# Patient Record
Sex: MALE | Employment: FULL TIME | URBAN - METROPOLITAN AREA
[De-identification: names, ages, dates, MRNs, and addresses within clinical notes are randomized per-mention and may not be internally consistent; named-entity substitution may affect disease eponyms.]

---

## 2022-12-05 ENCOUNTER — HOSPITAL ENCOUNTER (OUTPATIENT)
Dept: GENERAL RADIOLOGY | Age: 27
Discharge: HOME | End: 2022-12-05
Payer: COMMERCIAL

## 2022-12-05 ENCOUNTER — HOSPITAL ENCOUNTER (OUTPATIENT)
Age: 27
Discharge: HOME | End: 2022-12-05

## 2022-12-05 DIAGNOSIS — M25.40 PAINFUL SWELLING OF JOINT: ICD-10-CM

## 2022-12-05 PROCEDURE — 73610 X-RAY EXAM OF ANKLE: CPT

## 2022-12-29 ENCOUNTER — HOSPITAL ENCOUNTER (OUTPATIENT)
Dept: PHYSICAL THERAPY | Age: 27
Setting detail: THERAPIES SERIES
Discharge: HOME | End: 2022-12-29
Payer: COMMERCIAL

## 2022-12-29 PROCEDURE — 97530 THERAPEUTIC ACTIVITIES: CPT

## 2022-12-29 PROCEDURE — 97161 PT EVAL LOW COMPLEX 20 MIN: CPT

## 2022-12-29 PROCEDURE — 97016 VASOPNEUMATIC DEVICE THERAPY: CPT

## 2022-12-29 ASSESSMENT — PAIN DESCRIPTION - LOCATION: LOCATION: ANKLE

## 2022-12-29 ASSESSMENT — PAIN - FUNCTIONAL ASSESSMENT: PAIN_FUNCTIONAL_ASSESSMENT: PREVENTS OR INTERFERES WITH MANY ACTIVE NOT PASSIVE ACTIVITIES

## 2022-12-29 ASSESSMENT — PAIN DESCRIPTION - ONSET: ONSET: ON-GOING

## 2022-12-29 ASSESSMENT — PAIN DESCRIPTION - ORIENTATION: ORIENTATION: RIGHT

## 2022-12-29 ASSESSMENT — PAIN DESCRIPTION - FREQUENCY: FREQUENCY: INTERMITTENT

## 2022-12-29 ASSESSMENT — PAIN DESCRIPTION - PAIN TYPE: TYPE: CHRONIC PAIN

## 2022-12-29 ASSESSMENT — PAIN SCALES - GENERAL: PAINLEVEL_OUTOF10: 5

## 2022-12-29 NOTE — PLAN OF CARE
Decreased tolerance to work activity, Decreased strength, Decreased balance, Increased pain (edema)    Statement of Medical Necessity: Physical Therapy is both indicated and medically necessary as outlined in the POC to increase the likelihood of meeting the functionally related goals stated below. Patient's Activity Tolerance: Patient tolerated evaluation without incident      Patient's rehabilitation potential/prognosis is considered to be: Good    Factors which may impact rehabilitation potential include: None        GOALS   Patient Goal(s): Return to PLOF and work w/o restrictions or pain      Long Term Goals Completed by In 12 visits by 1/19/23, patient will Goal Status   demonstrate compliance and independence w/HEP. score at <= 5% disability on LEFS as indication of improved function w/daily activity. report return to full work duty activities w/no >= 3/10 pain. ambulate >= 1,200 ft in 6' w/o AD or ankle brace, normalized gait pattern and <= 2/10 ankle discomfort. ascend/descend 11 stairs, (1) HR light support, reciprocal pattern, normalized gait, <= 2/10 ankle discomfort.                                         TREATMENT PLAN       Requires PT Follow-Up: Yes  Treatment Initiated : Yes  Specific Instructions for Next Treatment: ankle PROM/AAROM, instruct isometrics, advance PREs over course of sessions, e-stim/vaso for pain/edema, balance/gait/stairs    Pt. actively involved in establishing Plan of Care and Goals: Yes  Patient/ Caregiver education and instruction: Goals, PT Role, Plan of Care, Evaluative findings, Home Exercise Program             Treatment may include any combination of the following: Current Treatment Recommendations: Strengthening, ROM, Balance training, Manual, Neuromuscular re-education, Stair training, Gait training, Pain management, Return to work related activity, Home exercise program, Patient/Caregiver education & training, Therapeutic activities, Dry needling, Modalities  Modalities: E-stim - unattended, Vasopneumatic Device (DNT)     Frequency / Duration:  Patient to be seen 2-3x/wk, up to 12 visits by 1/19/23 for   weeks       Patient Status: [x] Continue / Initiate Plan of Care     Signature: Electronically signed by Kai Carver PT on 12/29/2022 at 12:53 PM.     If you have any questions or concerns, please don't hesitate to call.   Thank you for your referral!

## 2022-12-29 NOTE — FLOWSHEET NOTE
Outpatient Physical Therapy  [facility]           [x] Phone: [phone]   Fax: [phone]  [facility]           [] Phone: [phone]   Fax: [phone]        Physical Therapy Daily Treatment Note  Date:  2022    Patient Name:  Marcie Martini    :  1995  MRN: [id number]  Restrictions/Precautions: No data recorded   Position Activity Restriction  Other position/activity restrictions: On light duty at work ([facility])  No lifting >10#, no heavy machinery, allowed to drive, on administrative duty at the time. Diagnosis:   Sprain of unspecified ligament of right ankle, initial encounter [S93.401A]    Date of Injury/Surgery: 22  Treatment Diagnosis:  right ankle pain/swelling, RLE weakness, impaired ankle AROM, antalgic gait  Insurance/Certification information: [facility] (12 visits by 23)  Referring Physician:  Megan Galvez PA-C     PCP: Sony Gamez PA-C  Next Doctor Visit:  Unknown  Plan of care signed (Y/N):  Pending  Outcome Measure: LEFS @ eval 25% disability  Visit# / total visits:     by 23  Pain level: 5/10   Goals:     Patient goals: Return to PLOF and work w/o restrictions or pain     Long Term Goals  Time Frame for Long Term Goals: In 12 visits by 23, patient will  demonstrate compliance and independence w/HEP. score at <= 5% disability on LEFS as indication of improved function w/daily activity. report return to full work duty activities w/no >= 3/10 pain. ambulate >= 1,200 ft in 6' w/o AD or ankle brace, normalized gait pattern and <= 2/10 ankle discomfort. ascend/descend 11 stairs, (1) HR light support, reciprocal pattern, normalized gait, <= 2/10 ankle discomfort. Summary of Evaluation:  Assessment: Patient is a 33 y.o. male w/DX sprain unspecified ligament R ankle. Pt reports injuring his R ankle while on a medical run when he stepped out of the ambulance.   His ankle rolled inward (weight taken through lateral foot.)  PLOF: Independent all mobility and self-care, no ankle pain. CURRENT LOF:  ankle throbbing pain, wearing air splint and ace wrap R ankle/foot, pain w/walking and any standing activities. Pt presents w/medial and lateral ankle pain/swelling, pain medial ankle/calf w/wbing, gait is antalgic, RLE weakness and impaired ankle ROM. No obvious joint laxity R ankle. Pt will benefit from physical therapy to address deficits related to R ankle injury which is limiting mobility and ability to work his full-time job w/o restrictions. Subjective:  See eval   MRI pending      Any changes in Ambulatory Summary Sheet? None        Objective:  See eval           Exercises: (No more than 4 columns)   Exercise/Equipment Date 12/29/22 #1 Date Date           WARM UP                     TABLE      Toe curls 2 x 10                                STANDING                                                     PROPRIOCEPTION                                    MODALITIES      VASO See below               Other Therapeutic Activities/Education:    Eval:    POC and treatment rationale/progression expected reviewed w/and accepted by patient. Reviewed RICE procedures for post injury swelling, edema, pain. Continue to wear air-splint or similar device to limit medial/lateral mobility at ankle. Pt having difficulty wearing regular shoes w/current air-splint. Home Exercise Program:    12/29/22:  toe curls    Manual Treatments:    NO    Modalities:    VASO  Sitting x 15'  Tolerated full compression and coldest setting  Pt tolerated well and without skin concerns. Communication with other providers:    POC faxed 12/29/22    Assessment:  (Response towards treatment session) (Pain Rating)  Assessment: Patient is a 33 y.o. male w/DX sprain unspecified ligament R ankle. Pt reports injuring his R ankle while on a medical run when he stepped out of the ambulance.   His ankle rolled inward (weight taken through lateral foot.)  PLOF:  Independent all mobility and self-care, no ankle pain. CURRENT LOF:  ankle throbbing pain, wearing air splint and ace wrap R ankle/foot, pain w/walking and any standing activities. Pt presents w/medial and lateral ankle pain/swelling, pain medial ankle/calf w/wbing, gait is antalgic, RLE weakness and impaired ankle ROM. No obvious joint laxity R ankle. Pt will benefit from physical therapy to address deficits related to R ankle injury which is limiting mobility and ability to work his full-time job w/o restrictions.       Plan for Next Session:   Specific Instructions for Next Treatment: ankle PROM/AAROM, instruct isometrics, advance PREs over course of sessions, e-stim/vaso for pain/edema, balance/gait/stairs    Time In / Time Out:    1113/1212       If [facility] Please Indicate Time In/Out/Total Time  [facility] (12 visits by 1/19/23)  CPT Code Time in Time out Total Time   10874         49036  4769  3868  15   50511  1157  1212 15                                 Total for session      30       Timed Code/Total Treatment Minutes:  15'/59'  TA 15' (1), VASO 15' (1)      Next Progress Note due:  10th visit      Plan of Care Interventions:  [x] Therapeutic Exercise  [x] Modalities:  [x] Therapeutic Activity     [] Ultrasound  [x] Estim  [x] Gait Training      [] Cervical Traction [] Lumbar Traction  [x] Neuromuscular Re-education    [] Cold/hotpack [] Iontophoresis   [x] Instruction in HEP      [x] Vasopneumatic   [x] Dry Needling    [x] Manual Therapy               [] Aquatic Therapy              Electronically signed by:  Kaleigh Padron PT, 12/29/2022, 12:57 PM

## 2022-12-29 NOTE — PROGRESS NOTES
Physical Therapy      Physical Therapy: Initial Evaluation    Patient: Kenisha Harris (55 y.o. male)   Examination Date: 15/39/4590  Plan of Care Certification Period: 2022 to        :  1995 ;    Confirmed: Yes MRN: [id number]  CSN: [id number]   Insurance: Payor: GENERIC SELF-INSURED / Plan: GENERIC SELF-INSURED WC 1500 / Product Type: *No Product type* /   Insurance ID: [id number] - (Worker's Comp) Secondary Insurance (if applicable):    Referring Physician: Bing Crespo     PCP: Ermelinda Luna PA-C Visits to Date/Visits Approved:    (by 23)    No Show/Cancelled Appts:   /       Medical Diagnosis: Sprain of unspecified ligament of right ankle, initial encounter [S93.401A]    Treatment Diagnosis: right ankle pain/swelling, RLE weakness, impaired ankle AROM, antalgic gait     PERTINENT MEDICAL HISTORY   Patient Assessed for Rehabilitation Services: Yes  Self reported health status[de-identified] Excellent    Medical History: Chart Reviewed: Yes   Past Medical History:   Diagnosis Date    Allergy-induced asthma      Surgical History:   Past Surgical History:   Procedure Laterality Date    WISDOM TOOTH EXTRACTION  2015       Medications: No current outpatient medications on file. Allergies: Cefzil [cefprozil] and Penicillins      SUBJECTIVE EXAMINATION     History obtained from[de-identified] Patient, Chart Review,      Family/Caregiver Present: No    Subjective History: Onset Date: 22  Subjective: Pt reports injuring his R ankle while on a medical run when he stepped out of the ambulance. His ankle rolled inward (weight taken through lateral foot.)  PLOF:  Independent all mobility and self-care, no ankle pain. CURRENT LOF:  ankle throbbing pain, wearing air splint and ace wrap R ankle/foot, pain w/walking and any standing activities.   Additional Pertinent Hx (if applicable): See above   Prior diagnostic testing[de-identified] X-ray (questionable bone fragment medial malleollus)      Learning/Language: Learning  Does the patient/guardian have any barriers to learning?: No barriers  What is the preferred language of the patient/guardian?: English  Is an  required?: No  How does the patient/guardian prefer to learn new concepts?: Listening, Reading, Demonstration     Pain Screening   Pain Screening  Patient Currently in Pain: Yes  Pain Assessment: 0-10  Pain Level: 5  Best Pain Level: 0  Worst Pain Level: 8 (in the last week)  Patient's Stated Pain Goal: 0 - No pain  Pain Type: Chronic pain  Pain Location: Ankle  Pain Orientation: Right  Pain Radiating Towards: medial ankle and calf  Pain Descriptors: Sharp, Throbbing, Aching, Tender  Pain Frequency: Intermittent  Pain Onset: On-going  Functional Pain Assessment: Prevents or interferes with many active not passive activities  Aggravating factors: Walking, Standing, Work duties  Pain Management/Relieving Factors: Rest, Ice, Laying supine, Medications (Ibuprofen)    Functional Status         Social History:  Social History  Lives With: Spouse  Type of Home: House  Home Layout: One level, Laundry in basement (flight to basement w/(1) HR)  Home Access: Stairs to enter without rails  Entrance Stairs - Number of Steps: 3 kyleigh    Occupation/Interests:  Occupation: Full time employment  Type of Occupation:  and EMS  Job Duties: Heavy lifting, Prolonged standing, Repetitive lifting, Awkward positions, Repetitive or prolonged grasping, Kneeling  Leisure & Hobbies: play basketball, walks, run    Prior Level of Function:  Independent w/high level activities w/o pain          Current Level of Function:  Independent w/low level activities w/R ankle pain and swelling,on light duty at work      [employer] Help From: Family  ADL Assistance: Independent  Homemaking Assistance: Independent (short periods of time)  Homemaking Responsibilities: Yes  Ambulation Assistance: Independent  Transfer Assistance: Independent  Active : Yes  Mode of Transportation: Car  Additional Comments: Pt pushes through the pain for home activities/homemaking    OBJECTIVE EXAMINATION   Restrictions:        Position Activity Restriction  Other position/activity restrictions: On light duty at work ([employer])  No lifting >10#, no heavy machinery, allowed to drive, on administrative duty at the time. Review of Systems:  Vision: Within Functional Limits  Hearing: Within functional limits  Overall Orientation Status: Within Normal Limits  Patient affect[de-identified] Normal  Follows Commands: Within Functional Limits    Observations:  General Observations  Description: antalgic gait, wearing airsplint and ace wrap RLE ankle/foot, R ankle edema  Observations  General Observations WB: tendency for pronation bilat    Palpation:   Right Ankle Palpation: medial/lateral malleoli (sinus tarsus, deltoid ligament area medially)    Ambulation/Gait (if applicable):  Ambulation  Surface: Carpet  Device: No Device  Other Apparatus:  (air splint R ankle)  Assistance: Independent  Quality of Gait: antalgic gait  Distance: 50 ft x 2    Left AROM  Right AROM       WFL       AROM RLE (degrees)  R Ankle Dorsiflexion (0-20): 3-35 deg  R Ankle Plantar Flexion (0-45): 35 deg  R Ankle Forefoot Inversion (0-40): 0-30 deg w/bilat ankle discomfort > medially than laterally  R Ankle Forefoot Eversion (0-20): 0-20 min discomfort contralaterally       Left Strength  Right Strength      Ankle General Strength Testing LE: Left WNL  Strength LLE  Comment: 5/5 MMGs Ankle General Strength Testing LE: Left WNL  Strength RLE  Comment: hip 4/5, knee 4/5, ankle DF 3-/5, PF 3+/5, EV 3-/5, INV 3-/5       Joint Mobility (if applicable):   Joint Integrity Ankle  General Joint Integrity Ankle: Joint mobility assessed  Right PA Talocrural Glide: WNL  Right Lateral Subtalar Glide: WNL, Painful  Right Medial Subtalar Glide:  WNL  Right Distal Tibulofibular AP Glide: WNL, Painful  Right Distal Tibulofibular PA Glide: WNL, Painful    Special Tests:   Special Tests for Foot and Ankle  Other Special Tests:  (R ankle girth w/figure 8 = 69 cm;  L ankle girth w/figure 8 = 66 cm. Girth at malleoli RLE = 34 cm, malleoli LLE 31 cm)    Additional Finding(s) (if applicable):    LEFS 31% disability       ASSESSMENT     Impression: Assessment: Patient is a 33 y.o. male w/DX sprain unspecified ligament R ankle. Pt reports injuring his R ankle while on a medical run when he stepped out of the ambulance. His ankle rolled inward (weight taken through lateral foot.)  PLOF:  Independent all mobility and self-care, no ankle pain. CURRENT LOF:  ankle throbbing pain, wearing air splint and ace wrap R ankle/foot, pain w/walking and any standing activities. Pt presents w/medial and lateral ankle pain/swelling, pain medial ankle/calf w/wbing, gait is antalgic, RLE weakness and impaired ankle ROM. No obvious joint laxity R ankle. Pt will benefit from physical therapy to address deficits related to R ankle injury which is limiting mobility and ability to work his full-time job w/o restrictions. Body Structures, Functions, Activity Limitations Requiring Skilled Therapeutic Intervention: Decreased functional mobility , Decreased ADL status, Decreased ROM, Decreased tolerance to work activity, Decreased strength, Decreased balance, Increased pain (edema)    Statement of Medical Necessity: Physical Therapy is both indicated and medically necessary as outlined in the POC to increase the likelihood of meeting the functionally related goals stated below.      Patient's Activity Tolerance: Patient tolerated evaluation without incident      Patient's rehabilitation potential/prognosis is considered to be: Good    Factors which may impact rehabilitation potential include: None        GOALS   Patient Goal(s): Return to PLOF and work w/o restrictions or pain      Long Term Goals Completed by In 12 visits by 1/19/23, patient will Goal Status   demonstrate compliance and independence w/HEP. score at <= 5% disability on LEFS as indication of improved function w/daily activity. report return to full work duty activities w/no >= 3/10 pain. ambulate >= 1,200 ft in 6' w/o AD or ankle brace, normalized gait pattern and <= 2/10 ankle discomfort. ascend/descend 11 stairs, (1) HR light support, reciprocal pattern, normalized gait, <= 2/10 ankle discomfort.                                         TREATMENT PLAN       Requires PT Follow-Up: Yes  Treatment Initiated : Yes  Specific Instructions for Next Treatment: ankle PROM/AAROM, instruct isometrics, advance PREs over course of sessions, e-stim/vaso for pain/edema, balance/gait/stairs    Pt. actively involved in establishing Plan of Care and Goals: Yes  Patient/ Caregiver education and instruction: Goals, PT Role, Plan of Care, Evaluative findings, Home Exercise Program             Treatment may include any combination of the following: Current Treatment Recommendations: Strengthening, ROM, Balance training, Manual, Neuromuscular re-education, Stair training, Gait training, Pain management, Return to work related activity, Home exercise program, Patient/Caregiver education & training, Therapeutic activities, Dry needling, Modalities  Modalities: E-stim - unattended, Vasopneumatic Device (DNT)     Frequency / Duration:  Patient to be seen 2-3x/wk, up to 12 visits by 1/19/23 for   weeks      Eval Complexity: Overall Evaluation : Low  Decision Making: Low Complexity  History: Personal Factors and/or Comorbidities Impacting POC: Low  History: See above  Examination of body system(s) including body structures and functions, activity limitations, and/or participation restrictions: Low  Exam: See above  Clinical Presentation: Low  Clinical Presentation: Stable presentation     Therapist Signature: Elmira Denney PT    Date: 12/76/1243     I certify that the above Therapy Services are being furnished while the patient is under my care. I agree with the treatment plan and certify that this therapy is necessary. Physician's Signature:  ___________________________   Date:_______                                                                   Rafat Ramirez PA-C        Physician Comments: _______________________________________________    Please sign and return to [id number]  [facility]. Please fax to the location listed below.  Jered Pike for this referral!    [address] [facility] [address], [address] [address]  Dept: [phone]  Dept Fax: [phone]  Loc: [phone]       POC NOTE

## 2023-01-09 ENCOUNTER — HOSPITAL ENCOUNTER (OUTPATIENT)
Dept: PHYSICAL THERAPY | Age: 28
Setting detail: THERAPIES SERIES
Discharge: HOME | End: 2023-01-09
Payer: COMMERCIAL

## 2023-01-09 PROCEDURE — 97016 VASOPNEUMATIC DEVICE THERAPY: CPT

## 2023-01-09 PROCEDURE — 97110 THERAPEUTIC EXERCISES: CPT

## 2023-01-09 PROCEDURE — 97140 MANUAL THERAPY 1/> REGIONS: CPT

## 2023-01-09 NOTE — FLOWSHEET NOTE
Outpatient Physical Therapy  [place]           [x] Phone: [phone]   Fax: [phone]  [place] [facility]           [] Phone: [phone]   Fax: [phone]        Physical Therapy Daily Treatment Note  Date:  2023    Patient Name:  Betsy Daniel    :  1995  MRN: [id number]  Restrictions/Precautions: No data recorded   Position Activity Restriction  Other position/activity restrictions: On light duty at work ([employer])  No lifting >10#, no heavy machinery, allowed to drive, on administrative duty at the time. Diagnosis:   Sprain of unspecified ligament of right ankle, initial encounter [S93.401A]    Date of Injury/Surgery: 22  Treatment Diagnosis:  right ankle pain/swelling, RLE weakness, impaired ankle AROM, antalgic gait  Insurance/Certification information: [facility] (12 visits by 23)  Referring Physician:  Blank Gauthier PA-C     PCP: Christy Schneider PA-C  Next Doctor Visit:  Unknown  Plan of care signed (Y/N):  Pending  Outcome Measure: LEFS @ eval 25% disability  Visit# / total visits:   2/  by 23  Pain level: 0/10 4/10 with ankle pronation  Goals:     Patient goals: Return to PLOF and work w/o restrictions or pain     Long Term Goals  Time Frame for Long Term Goals: In 12 visits by 23, patient will  demonstrate compliance and independence w/HEP. score at <= 5% disability on LEFS as indication of improved function w/daily activity. report return to full work duty activities w/no >= 3/10 pain. ambulate >= 1,200 ft in 6' w/o AD or ankle brace, normalized gait pattern and <= 2/10 ankle discomfort. ascend/descend 11 stairs, (1) HR light support, reciprocal pattern, normalized gait, <= 2/10 ankle discomfort. Summary of Evaluation:  Assessment: Patient is a 33 y.o. male w/DX sprain unspecified ligament R ankle. Pt reports injuring his R ankle while on a medical run when he stepped out of the ambulance.   His ankle rolled inward (weight taken through lateral foot.)  PLOF:  Independent all mobility and self-care, no ankle pain. CURRENT LOF:  ankle throbbing pain, wearing air splint and ace wrap R ankle/foot, pain w/walking and any standing activities. Pt presents w/medial and lateral ankle pain/swelling, pain medial ankle/calf w/wbing, gait is antalgic, RLE weakness and impaired ankle ROM. No obvious joint laxity R ankle. Pt will benefit from physical therapy to address deficits related to R ankle injury which is limiting mobility and ability to work his full-time job w/o restrictions. Subjective:  Pt reports no pain this morning. On Saturday he was on it a lot and his pain was a 7/10; throbbing. He was able to get an MRI 1/5/23. He hasn't received the results yet. He wants to         Any changes in Ambulatory Summary Sheet? None        Objective: At arrival mild antalgic w decreased heel strike and toe off. Wears ankle brace at all times when out in the community and working on his feet. Reports lateral and medial stretch with wedge stretch. Exercises: (No more than 4 columns)   Exercise/Equipment Date 12/29/22 #1 Date  1/9/23 #2 Date           WARM UP      Nustep  5' lv4    Toe flex  X 20    ABC  x1    TABLE      Toe curls 2 x 10 x20    BAPS board circles  Lv2 x20 ea. dir                         STANDING      Gastroc stretch  30s x 3  Foam wedge                                             PROPRIOCEPTION                                    MODALITIES      VASO See below 10'              Other Therapeutic Activities/Education:    Eval:    POC and treatment rationale/progression expected reviewed w/and accepted by patient. Reviewed RICE procedures for post injury swelling, edema, pain. Continue to wear air-splint or similar device to limit medial/lateral mobility at ankle. Pt having difficulty wearing regular shoes w/current air-splint. Educated on anatomy and physiology of ankle.     Home Exercise Program:    12/29/22:  toe judy    Access Code: [email or web address]  URL: [email or web address]  Date: 01/09/2023  Prepared by: Juliet Malling    Exercises  Towel Scrunches - 1 x daily - 7 x weekly - 3 sets - 10 reps  Supine Ankle Inversion Eversion AROM - 1 x daily - 7 x weekly - 3 sets - 10 reps  Supine Ankle Pumps - 1 x daily - 7 x weekly - 3 sets - 10 reps  Seated Ankle Inversion Eversion PROM - 1 x daily - 7 x weekly - 3 sets - 10 reps  Seated Gastroc Stretch with Strap - 1 x daily - 7 x weekly - 3 sets - 1 reps - 30s hold  Standing Gastroc Stretch on Step with Counter Support - 1 x daily - 7 x weekly - 3 sets - 1 reps - 30s hold      Manual Treatments:    PROM, gentle stretch    Modalities:    VASO  Sitting x 10'  Tolerated full compression and coldest setting  Pt tolerated well and without skin concerns. Communication with other providers:    POC faxed 12/29/22    Assessment:  (Response towards treatment session) (Pain Rating)  Pt demonstrated good tolerance to stretch with mild increase in medial ankle pain with ambulation without brace. Updated HEP. Recommended continuing using ankle brace and icing until MRI results. Pt would continue to benefit from skilled therapy interventions to address remaining impairments, improve mobility and strength and progress toward goal completion while reducing risk for re-injury or further decline. 0/10 with 4/10 with excessive pronation. Assessment: Patient is a 33 y.o. male w/DX sprain unspecified ligament R ankle. Pt reports injuring his R ankle while on a medical run when he stepped out of the ambulance. His ankle rolled inward (weight taken through lateral foot.)  PLOF:  Independent all mobility and self-care, no ankle pain. CURRENT LOF:  ankle throbbing pain, wearing air splint and ace wrap R ankle/foot, pain w/walking and any standing activities. Pt presents w/medial and lateral ankle pain/swelling, pain medial ankle/calf w/wbing, gait is antalgic, RLE weakness and impaired ankle ROM.   No obvious joint laxity R ankle. Pt will benefit from physical therapy to address deficits related to R ankle injury which is limiting mobility and ability to work his full-time job w/o restrictions.       Plan for Next Session:   Specific Instructions for Next Treatment: ankle PROM/AAROM, instruct isometrics, advance PREs over course of sessions, e-stim/vaso for pain/edema, balance/gait/stairs    Time In / Time Out:  3535/5755       If [facility] Please Indicate Time In/Out/Total Time  [facility] (12 visits by 1/19/23)  CPT Code Time in Time out Total Time   30782  0800 0828 28   01409  0828 0838 10   53597  0838 0848 10                                 Total for session      48       Timed Code/Total Treatment Minutes:  38'/48' 2 TE 1 MT 1 vaso    Next Progress Note due:  10th visit      Plan of Care Interventions:  [x] Therapeutic Exercise  [x] Modalities:  [x] Therapeutic Activity     [] Ultrasound  [x] Estim  [x] Gait Training      [] Cervical Traction [] Lumbar Traction  [x] Neuromuscular Re-education    [] Cold/hotpack [] Iontophoresis   [x] Instruction in HEP      [x] Vasopneumatic   [x] Dry Needling    [x] Manual Therapy               [] Aquatic Therapy              Electronically signed by:  Joya Nelson PTA, CLT 1/9/2023, 7:55 AM

## 2023-01-11 ENCOUNTER — HOSPITAL ENCOUNTER (OUTPATIENT)
Dept: PHYSICAL THERAPY | Age: 28
Setting detail: THERAPIES SERIES
Discharge: HOME | End: 2023-01-11
Payer: COMMERCIAL

## 2023-01-11 PROCEDURE — 97016 VASOPNEUMATIC DEVICE THERAPY: CPT

## 2023-01-11 PROCEDURE — 97140 MANUAL THERAPY 1/> REGIONS: CPT

## 2023-01-11 PROCEDURE — G0283 ELEC STIM OTHER THAN WOUND: HCPCS

## 2023-01-11 PROCEDURE — 97110 THERAPEUTIC EXERCISES: CPT

## 2023-01-11 NOTE — FLOWSHEET NOTE
Outpatient Physical Therapy  [place]           [x] Phone: [phone]   Fax: [phone]  [facility]           [] Phone: [phone]   Fax: [phone]        Physical Therapy Daily Treatment Note  Date:  2023    Patient Name:  Diana Talley    :  1995  MRN: [id number]  Restrictions/Precautions: No data recorded   Position Activity Restriction  Other position/activity restrictions: On light duty at work ([employer])  No lifting >10#, no heavy machinery, allowed to drive, on administrative duty at the time. Diagnosis:   Sprain of unspecified ligament of right ankle, initial encounter [S93.401A]    Date of Injury/Surgery: 22  Treatment Diagnosis:  right ankle pain/swelling, RLE weakness, impaired ankle AROM, antalgic gait  Insurance/Certification information: [facility] (12 visits by 23)  Referring Physician:  Sergei Sosa PA-C     PCP: Mathew Miramontes PA-C  Next Doctor Visit:  Unknown  Plan of care signed (Y/N):  Pending  Outcome Measure: LEFS @ eval 25% disability  Visit# / total visits:   2  by 23  Pain level:  4/10   Goals:     Patient goals: Return to PLOF and work w/o restrictions or pain     Long Term Goals  Time Frame for Long Term Goals: In 12 visits by 23, patient will  demonstrate compliance and independence w/HEP. score at <= 5% disability on LEFS as indication of improved function w/daily activity. report return to full work duty activities w/no >= 3/10 pain. ambulate >= 1,200 ft in 6' w/o AD or ankle brace, normalized gait pattern and <= 2/10 ankle discomfort. ascend/descend 11 stairs, (1) HR light support, reciprocal pattern, normalized gait, <= 2/10 ankle discomfort. Summary of Evaluation:  Assessment: Patient is a 33 y.o. male w/DX sprain unspecified ligament R ankle. Pt reports injuring his R ankle while on a medical run when he stepped out of the ambulance.   His ankle rolled inward (weight taken through lateral foot.)  PLOF: Independent all mobility and self-care, no ankle pain. CURRENT LOF:  ankle throbbing pain, wearing air splint and ace wrap R ankle/foot, pain w/walking and any standing activities. Pt presents w/medial and lateral ankle pain/swelling, pain medial ankle/calf w/wbing, gait is antalgic, RLE weakness and impaired ankle ROM. No obvious joint laxity R ankle. Pt will benefit from physical therapy to address deficits related to R ankle injury which is limiting mobility and ability to work his full-time job w/o restrictions. Subjective:  Pt stated that  his pain was about 4/10 today. Pt stated that he can \"feel it more today. \"        Any changes in Ambulatory Summary Sheet? None        Objective:      Pt wearing lace up brace. Noted moderate edema in ankle today. Exercises: (No more than 4 columns)   Exercise/Equipment Date 12/29/22 #1 Date  1/9/23 #2 1/11/2023 #3           WARM UP      Nustep  5' lv4 L-5 x 5'    Toe flex  X 20    ABC  x1 1x   TABLE      Toe curls 2 x 10 x20 20x   BAPS board circles  Lv2 x20 ea. dir L-2 x 20 ea dir                        STANDING      Gastroc stretch  30s x 3  Foam wedge                                             PROPRIOCEPTION                                    MODALITIES      VASO See below 10' 15' with IFC              Other Therapeutic Activities/Education:    Eval:    POC and treatment rationale/progression expected reviewed w/and accepted by patient. Reviewed RICE procedures for post injury swelling, edema, pain. Continue to wear air-splint or similar device to limit medial/lateral mobility at ankle. Pt having difficulty wearing regular shoes w/current air-splint. Educated on anatomy and physiology of ankle. Home Exercise Program:    12/29/22:  toe curls    Access Code: Rodolfo Alexandra  URL: [email or web address]  Date: 01/09/2023  Prepared by: Zack Master    Exercises  Towel Scrunches - 1 x daily - 7 x weekly - 3 sets - 10 reps  Supine Ankle Inversion Eversion AROM - 1 x daily - 7 x weekly - 3 sets - 10 reps  Supine Ankle Pumps - 1 x daily - 7 x weekly - 3 sets - 10 reps  Seated Ankle Inversion Eversion PROM - 1 x daily - 7 x weekly - 3 sets - 10 reps  Seated Gastroc Stretch with Strap - 1 x daily - 7 x weekly - 3 sets - 1 reps - 30s hold  Standing Gastroc Stretch on Step with Counter Support - 1 x daily - 7 x weekly - 3 sets - 1 reps - 30s hold      Manual Treatments:    PROM, gentle stretch    Modalities:    VASO  Sitting x 10'  Tolerated full compression and coldest setting  Pt tolerated well and without skin concerns. Communication with other providers:    POC faxed 12/29/22    Assessment:  (Response towards treatment session) (Pain Rating)Pt with fair tolerance to exercises. Trial of manual stretching and STM and IFC . Pt would continue to benefit from skilled therapy interventions to address remaining impairments, improve mobility and strength and progress toward goal completion while reducing risk for re-injury or further decline. Pt reported  pain at 0/10 after treatment today. Assessment: Patient is a 33 y.o. male w/DX sprain unspecified ligament R ankle. Pt reports injuring his R ankle while on a medical run when he stepped out of the ambulance. His ankle rolled inward (weight taken through lateral foot.)  PLOF:  Independent all mobility and self-care, no ankle pain. CURRENT LOF:  ankle throbbing pain, wearing air splint and ace wrap R ankle/foot, pain w/walking and any standing activities. Pt presents w/medial and lateral ankle pain/swelling, pain medial ankle/calf w/wbing, gait is antalgic, RLE weakness and impaired ankle ROM. No obvious joint laxity R ankle. Pt will benefit from physical therapy to address deficits related to R ankle injury which is limiting mobility and ability to work his full-time job w/o restrictions.       Plan for Next Session:   Specific Instructions for Next Treatment: ankle PROM/AAROM, instruct isometrics, advance PREs over course of sessions, e-stim/vaso for pain/edema, balance/gait/stairs    Time In / Time Out:  [id number]       If [facility] Please Indicate Time In/Out/Total Time  [facility] (12 visits by 1/19/23)  CPT Code Time in Time out Total Time   92322  0832 0845 13'    04157       11755  0910 0925 15'   [address]42  [address] 10'    49490 0855 0910 15'                  Total for session     48'        Timed Code/Total Treatment Minutes:    23'/ 48'  1 man (10') 1 TE ( 13') 1 E stim (15') 1 vaso (15')     Next Progress Note due:  10th visit      Plan of Care Interventions:  [x] Therapeutic Exercise  [x] Modalities:  [x] Therapeutic Activity     [] Ultrasound  [x] Estim  [x] Gait Training      [] Cervical Traction [] Lumbar Traction  [x] Neuromuscular Re-education    [] Cold/hotpack [] Iontophoresis   [x] Instruction in HEP      [x] Vasopneumatic   [x] Dry Needling    [x] Manual Therapy               [] Aquatic Therapy              Electronically signed by:  Patricio Mccord PTA 1/11/2023, 8:32 AM     1/11/2023,4:54 PM

## 2023-03-02 ENCOUNTER — HOSPITAL ENCOUNTER (OUTPATIENT)
Dept: PHYSICAL THERAPY | Age: 28
Setting detail: THERAPIES SERIES
Discharge: HOME | End: 2023-03-02

## 2023-03-02 NOTE — DISCHARGE SUMMARY
Outpatient Physical Therapy           [place]           [] Phone: [phone]   Fax: [phone]  [place]           [] Phone: [phone]   Fax: [phone]      To: Valeri Ford PA-C     From: Neel García PT, PT     Patient: Dianne Concepcion                    : 1995  Diagnosis:  Sprain of unspecified ligament of right ankle, initial encounter [S93.401A]        Treatment Diagnosis:       Date: 3/2/2023  []  Progress Note                [x]  Discharge Note    Evaluation Date:     Total Visits to date:    Cancels/No-shows to date:      Subjective:        Plan of Care/Treatment to date:  [] Therapeutic Exercise    [] Modalities:  [] Therapeutic Activity     [] Ultrasound  [] Electrical Stimulation  [] Gait Training      [] Cervical Traction   [] Lumbar Traction  [] Neuromuscular Re-education  [] Cold/hotpack [] Iontophoresis  [] Instruction in HEP      Other:  [] Manual Therapy       []  Vasopneumatic  [] Aquatic Therapy       []   Dry Needle Therapy                      Objective/Significant Findings At Last Visit/Comments:        Assessment:         Goal Status:  [] Achieved [] Partially Achieved  [] Not Achieved           Frequency/Duration:  # Days per week: [] 1 day # Weeks: [] 1 week [] 4 weeks [] 8 weeks     [] 2 days   [] 2 weeks [] 5 weeks [] 10 weeks     [] 3 days   [] 3 weeks [] 6 weeks [] 12 weeks       Rehab Potential: [] Excellent [] Good [] Fair  [] Poor         Patient Status: [] Continue per initial plan of Care     [] Patient now discharged     [] Additional visits requested, Please re-certify for additional visits:      Requested frequency/duration:  ***/week for ***weeks    If we are requesting more visits, we fully anticipate the patient's condition is expected to improve within the treatment timeframe we are requesting.     Electronically signed by:  Neel García PT, PT, 3/2/2023, 10:34 AM    If you have any questions or concerns, please don't hesitate to call.  Thank you for your referral.    Physician Signature:______________________ Date:______ Time: ________  By signing above, therapists plan is approved by physician

## 2023-03-02 NOTE — FLOWSHEET NOTE
Outpatient Physical Therapy  [place]           [x] Phone: [phone]   Fax: [phone]  [facility]           [] Phone: [phone]   Fax: [phone]        Physical Therapy Daily Treatment Note  Date:  3/2/2023    Patient Name:  Tunde Staples    :  1995  MRN: [id number]  Restrictions/Precautions: No data recorded      Diagnosis:   Sprain of unspecified ligament of right ankle, initial encounter [S93.401A]    Date of Injury/Surgery:   Treatment Diagnosis:     Insurance/Certification information:    Referring Physician:  Tanja Edwards     PCP: Isabel Blanco PA-C  Next Doctor Visit:    Plan of care signed (Y/N):    Outcome Measure:   Visit# / total visits:   /  Pain level: /10   Goals:     Patient goals:    Short term goals  Time Frame for Short term goals:                   Long Term Goals  Time Frame for Long Term Goals:                       Summary of Evaluation:           Subjective:  See eval         Any changes in Ambulatory Summary Sheet?   None        Objective:  See eval           Exercises: (No more than 4 columns)   Exercise/Equipment Date Date Date           WARM UP                     TABLE                                       STANDING                                                     PROPRIOCEPTION                                    MODALITIES                      Other Therapeutic Activities/Education:        Home Exercise Program:        Manual Treatments:        Modalities:        Communication with other providers:        Assessment:  (Response towards treatment session) (Pain Rating)         Plan for Next Session:        Time In / Time Out:    1045/1120       If [facility] Please Indicate Time In/Out/Total Time  CPT Code Time in Time out Total Time   75494  1055  1110 15'   83217  1110 1120 10'                                          Total for session      25'       Timed Code/Total Treatment Minutes:  25'/35'  TE 15' (1), Manual 10' (1)      Next Progress Note due:  10 visits      Plan of Care Interventions:  [] Therapeutic Exercise  [] Modalities:  [] Therapeutic Activity     [] Ultrasound  [] Estim  [] Gait Training      [] Cervical Traction [] Lumbar Traction  [] Neuromuscular Re-education    [] Cold/hotpack [] Iontophoresis   [] Instruction in HEP      [] Vasopneumatic   [] Dry Needling    [] Manual Therapy               [] Aquatic Therapy              Electronically signed by:  Radha Killian PT, 3/2/2023, 6:23 PM

## 2023-03-08 ENCOUNTER — HOSPITAL ENCOUNTER (OUTPATIENT)
Dept: PHYSICAL THERAPY | Age: 28
Setting detail: THERAPIES SERIES
Discharge: HOME | End: 2023-03-08
Payer: COMMERCIAL

## 2023-03-08 PROCEDURE — 97110 THERAPEUTIC EXERCISES: CPT

## 2023-03-08 PROCEDURE — 97016 VASOPNEUMATIC DEVICE THERAPY: CPT

## 2023-03-08 NOTE — FLOWSHEET NOTE
Outpatient Physical Therapy  [place]           [x] Phone: [phone]   Fax: [phone]  [place]           [] Phone: [phone]   Fax: [phone]        Physical Therapy Daily Treatment Note  Date:  3/8/2023    Patient Name:  Stacie Morrison    :  1995  MRN: [id number]  Restrictions/Precautions: WBAT, weaning out of boot. Diagnosis:   Sprain of unspecified ligament of right ankle, initial encounter [S93.401A]    Date of Injury/Surgery:   Treatment Diagnosis:    Insurance/Certification information: [facility] 12 sessions from 12/15-  Referring Physician:  Ysabel Wilcox PA-C     PCP: Kaela Wright PA-C  Next Doctor Visit:    Plan of care signed (Y/N):    Outcome Measure: MORATAYA balance 31  Visit# / total visits:     Pain level: 0/10               Subjective:   Christiano arrives to therapy stating that the ankle has no pain in it currently. He reports that he is weaning out of the boot and this is going well. Saw doc and bone is healing well, just about getting strength back. His ankle just feels weak and unstable. Any changes in Ambulatory Summary Sheet? None        Objective:  COVID screening questions were asked and patient attested that there had been no contact or symptoms     No pain with PROM in any direction. Ambulates into clinic with no AD and boot.          Exercises: (No more than 4 columns)   Exercise/Equipment 3/8/2023 #2 Date Date           WARM UP      Nu-step  Next            TABLE      4 way ankle  GTB 20* ea      [place] board all directions 20* ea      Calf stretch with towel gastroc/soleus  2*30\" ea     Heel/Toe raises seated with feet on floor  20* ea              STANDING      Heel/Toe Raises  2*10      March  20* ea LE alt     Hip abduction bilaterally  2*10 ea LE       STS 20*                             PROPRIOCEPTION                                    MODALITIES                      Other Therapeutic Activities/Education:  3/8/23: Advised patient to bring other shoe next session. Home Exercise Program:  continue with current       Manual Treatments:  PROM in all directions to tolerance 5'      Modalities:  Patient received vasocompression on their R ankle for swelling for 10 min on medium pressure. Patient had negative skin reaction afterwards. Girth measurements around extremity were pre: 30cm/ post 29cm    See subjective and assessment for pre and post treatment patient reported pain levels. Communication with other providers:  none      Assessment:  Alex tolerated today's interventions well with only min increase in discomfort with standing calf raises. He is lacking full DF ROM as well as general strength and stability of the ankle.  End session pain: 2-3/10 soreness from working out      Plan for Next Session:       Time In / Time Out:    1500/1550       If [facility] Please Indicate Time In/Out/Total Time  CPT Code Time in Time out Total Time   TE 54954  1500 1540 40   Man 72170       Vaso 01182  7289 2801 10   NR 33335                          Total for session    50       Timed Code/Total Treatment Minutes:  40'/50' 1 vaso 3 TE       Next Progress Note due:  10 visits      Plan of Care Interventions:  [] Therapeutic Exercise  [] Modalities:  [] Therapeutic Activity     [] Ultrasound  [] Estim  [] Gait Training      [] Cervical Traction [] Lumbar Traction  [] Neuromuscular Re-education    [] Cold/hotpack [] Iontophoresis   [] Instruction in HEP      [] Vasopneumatic   [] Dry Needling    [] Manual Therapy               [] Aquatic Therapy              Electronically signed by:  Eula Kirkland     3/8/2023, 10:36 AM

## 2023-03-10 ENCOUNTER — HOSPITAL ENCOUNTER (OUTPATIENT)
Dept: PHYSICAL THERAPY | Age: 28
Setting detail: THERAPIES SERIES
Discharge: HOME | End: 2023-03-10
Payer: COMMERCIAL

## 2023-03-10 PROCEDURE — 97112 NEUROMUSCULAR REEDUCATION: CPT

## 2023-03-10 PROCEDURE — 97110 THERAPEUTIC EXERCISES: CPT

## 2023-03-10 PROCEDURE — 97016 VASOPNEUMATIC DEVICE THERAPY: CPT

## 2023-03-10 NOTE — FLOWSHEET NOTE
Outpatient Physical Therapy  [place]           [x] Phone: [phone]   Fax: [phone]  [place]           [] Phone: [phone]   Fax: [phone]        Physical Therapy Daily Treatment Note  Date:  3/10/2023    Patient Name:  Hossein Cueva    :  1995  MRN: [id number]  Restrictions/Precautions: WBAT, weaning out of boot. Diagnosis:   Sprain of unspecified ligament of right ankle, initial encounter [S93.401A]    Date of Injury/Surgery:   Treatment Diagnosis:    Insurance/Certification information: [facility] 12 sessions from 12/15-  Referring Physician:  Gunner White PA-C     PCP: Rosaura Mancini PA-C  Next Doctor Visit:    Plan of care signed (Y/N):    Outcome Measure: MORATAYA balance 31  Visit# / total visits:     Pain level: 0/10 currently      Subjective: Pt stated he is only wearing his boot at work. He does feel the difference in strength one foot to the other. Light duty at work. Any changes in Ambulatory Summary Sheet? None        Objective:  COVID screening questions were asked and patient attested that there had been no contact or symptoms     No pain with PROM in any direction. Ambulates into clinic with no AD and shoe. Mostly normalized gait pattern    AROM/PROM R Ankle:  PF 45°  DF 0°/16°  Ev 32°   Inv 20°    MMT 5/5 all direction  PF SL 5/5 with some medial ankle sore/sharp    Harman-lateral pain with end range CKC PF 3/10 with end PF and DF CKC range.     Exercises: (No more than 4 columns)   Exercise/Equipment 3/8/2023 #2 3/10/23 #3 Date           WARM UP      Scifit Next  5' lv 5           TABLE      4 way ankle  GTB 20* ea  GTB 20* ea  BluTB  PF     [place] board all directions 20* ea  manual    Calf stretch with towel gastroc/soleus  2*30\" ea 2*30\" ea    Heel/Toe raises seated with feet on floor  20* ea              STANDING      Heel/Toe Raises  2*10  X 20 pain free range DL    Marches  20* ea LE alt 20* ea LE alt  20* ea LE alt on airex    Hip abduction bilaterally  2*10 ea LE   2*10 ea LE   On airex    STS 20*      Mini squat  X20 on airex    Step up over fwd  X10 4\"               PROPRIOCEPTION                                    MODALITIES                      Other Therapeutic Activities/Education:  3/8/23: Advised patient to bring other shoe next session. Home Exercise Program:  continue with current       Manual Treatments:  PROM in all directions to tolerance 5'      Modalities:  Patient received vasocompression on their R ankle for swelling for 10 min on medium pressure. Patient had negative skin reaction afterwards. Girth measurements around extremity were pre: 32 cm malleoli circumf; 64 fig 8/ post 31.5cm    See subjective and assessment for pre and post treatment patient reported pain levels. Communication with other providers:  none      Assessment:  Pt tolerated today's interventions well with added exercises. Some instability in the ankle and decreased strength. Compliant with HEP. Pt would continue to benefit from skilled therapy interventions to address remaining impairments, improve mobility and strength and progress toward goal completion while reducing risk for re-injury or further decline.      End session pain: 0/10 post vaso 3/10 with intermittent sharp shooting pain with some WB angles      Plan for Next Session: ankle ev/in strengthening  to tolerance and pain management      Time In / Time Out:   1300/1350       If [facility] Please Indicate Time In/Out/Total Time  CPT Code Time in Time out Total Time   TE 10951  1300 1330 30   Man 90432       Vaso 61959  1340 1350 10   NR 93550  1330 1340 10                      Total for session    50       Timed Code/Total Treatment Minutes:  40'/50' 1 vaso 2 TE 1NR      Next Progress Note due:  10 visits      Plan of Care Interventions:  [] Therapeutic Exercise  [] Modalities:  [] Therapeutic Activity     [] Ultrasound  [] Estim  [] Gait Training      [] Cervical Traction [] Lumbar Traction  [] Neuromuscular Re-education    [] Cold/hotpack [] Iontophoresis   [] Instruction in HEP      [] Vasopneumatic   [] Dry Needling    [] Manual Therapy               [] Aquatic Therapy              Electronically signed by:  Xavier Block PTA CLT  3/10/2023, 1:08 PM

## 2023-03-14 ENCOUNTER — HOSPITAL ENCOUNTER (OUTPATIENT)
Dept: PHYSICAL THERAPY | Age: 28
Setting detail: THERAPIES SERIES
Discharge: HOME | End: 2023-03-14
Payer: COMMERCIAL

## 2023-03-14 PROCEDURE — 97016 VASOPNEUMATIC DEVICE THERAPY: CPT

## 2023-03-14 PROCEDURE — 97140 MANUAL THERAPY 1/> REGIONS: CPT

## 2023-03-14 PROCEDURE — 97110 THERAPEUTIC EXERCISES: CPT

## 2023-03-14 PROCEDURE — 97112 NEUROMUSCULAR REEDUCATION: CPT

## 2023-03-14 NOTE — FLOWSHEET NOTE
Outpatient Physical Therapy  [address]           [x] Phone: [phone]   Fax: [phone]  [facility] [place]           [] Phone: [phone]   Fax: [phone]        Physical Therapy Daily Treatment Note  Date:  3/14/2023    Patient Name:  Thierno Best    :  1995  MRN: [id number]  Restrictions/Precautions: WBAT, weaning out of boot. Diagnosis:   Sprain of unspecified ligament of right ankle, initial encounter [S93.401A]    Date of Injury/Surgery: 22  Treatment Diagnosis:   right ankle pain/swelling, RLE weakness, impaired ankle AROM, antalgic gait  Insurance/Certification information:  [facility]  Referring Physician:  Marleni Brewer PA-C     PCP: Deisy Zaragoza PA-C  Next Doctor Visit:  Unknown  Plan of care signed (Y/N):  Y  Outcome Measure:   22:  LEFS @ eval 25% disability  3/02/23:    LEFS @ Re-eval 42.5% disability  Visit# / total visits:       [facility] 12 sessions from 12/15-  Pain level:      0/10           Goals:        Patient goals: Patient goals: Return to PLOF and work w/o restrictions or pain  Long Term Goals  Time Frame for Long Term Goals: In 12 visits by 23, patient will  demonstrate compliance and independence w/HEP. score at <= 5% disability on LEFS as indication of improved function w/daily activity. report return to full work duty activities w/no >= 3/10 pain. ambulate >= 1,200 ft in 6' w/o AD or ankle brace, normalized gait pattern and <= 2/10 ankle discomfort. ascend/descend 11 stairs, (1) HR light support, reciprocal pattern, normalized gait, <= 2/10 ankle discomfort. Summary of Evaluation:     Patient is a 33 y.o. male w/DX sprain unspecified ligament R ankle. Pt reports injuring his R ankle while on a medical run when he stepped out of the ambulance. His ankle rolled inward (weight taken through lateral foot.)  PLOF:  Independent all mobility and self-care, no ankle pain.   CURRENT LOF:  ankle throbbing pain, wearing air splint and ace wrap R ankle/foot, pain w/walking and any standing activities. Pt presents w/medial and lateral ankle pain/swelling, pain medial ankle/calf w/wbing, gait is antalgic, RLE weakness and impaired ankle ROM. No obvious joint laxity R ankle. Pt will benefit from physical therapy to address deficits related to R ankle injury which is limiting mobility and ability to work his full-time job w/o restrictions. 3/02/23:  Pt returned after a break in therapy d/t findings of ankle fx. Pt was placed in a rocker bottom boot and now to be weaned from the boot as tolerated. Will resume therapy as per initial evaluation/plan of care. Subjective: Geno Billingsley arrives to therapy stating that there is no pain in the ankle or foot, just soreness. Any changes in Ambulatory Summary Sheet? None        Objective:  COVID screening questions were asked and patient attested that there had been no contact or symptoms     Arrival to clinic without boot or AD. Discomfort felt in anterior ankle with soleus stretch.     AROM DF 2° after stretching/joint mobilizations     Exercises: (No more than 4 columns)   Exercise/Equipment 3/8/2023 #2 3/10/23 #3 3/14/2023 #4             WARM UP       Scifit Next  5' lv 5  5'           TABLE       4 way ankle  GTB 20* ea  GTB 20* ea  BluTB  PF  HEP     [place] board all directions 20* ea  manual --    Calf stretch with towel gastroc/soleus  2*30\" ea 2*30\" ea Standing as below    Heel/Toe raises seated with feet on floor  20* ea  --              STANDING       Heel/Toe Raises  2*10  X 20 pain free range DL 20* pain free range DL     Gastroc/Soleus stretches    On incline board 2*30\" ea    Marches  20* ea LE alt 20* ea LE alt  20* ea LE alt on airex 2*20 steps on airex  BOSU   BOSU step up and over laterally    Next     Hip abduction bilaterally  2*10 ea LE   2*10 ea LE   On airex 2*10 ea LE     STS 20*   --    Mini squat  X20 on airex 20*    Step up over fwd  X10 4\" 6\" 10*    Wobble board taps    10* ea dir Anterior lunge to 6\" step for increased DF    6\" 10*           PROPRIOCEPTION       Wobble board balance    30\" ea dir     BOSU DL stance    Next                          MODALITIES                         Other Therapeutic Activities/Education:  3/8/23: Advised patient to bring other shoe next session. Home Exercise Program:  continue with current       Manual Treatments: Posterior TC joint mobs, MWM as well for increased DF    Modalities:  Patient received vasocompression on their R ankle for swelling for 10 min on medium pressure. Patient had negative skin reaction afterwards. Girth measurements around extremity were pre: 32.5 cm malleoli circumf; 31 cm    See subjective and assessment for pre and post treatment patient reported pain levels. Communication with other providers:  none      Assessment:  Jennifer Spaulding was able to progress exercises this date with min increase in discomfort. He is tight in his soleus and has limited TC joint mobility making it difficult for him to DF to his full ability. He is functionally weak in his PF's.  End session pain: 0/10      Plan for Next Session: ankle ev/in strengthening  to tolerance and pain management      Time In / Time Out:   1410/1458       If [facility] Please Indicate Time In/Out/Total Time  CPT Code Time in Time out Total Time   TE 16289  5503 9556 18   Man 26204  5904 7611 90   GAGU 05487  8968 3217 10   NR 15894  1428 1438 10                      Total for session    48'       Timed Code/Total Treatment Minutes:  38'/48' 1 vaso 1 man 1 NR 1 TE       Next Progress Note due:  10 visits      Plan of Care Interventions:  [] Therapeutic Exercise  [] Modalities:  [] Therapeutic Activity     [] Ultrasound  [] Estim  [] Gait Training      [] Cervical Traction [] Lumbar Traction  [] Neuromuscular Re-education    [] Cold/hotpack [] Iontophoresis   [] Instruction in HEP      [] Vasopneumatic   [] Dry Needling    [] Manual Therapy               [] Aquatic Therapy Electronically signed by:  Opal Mosqueda, PTA      3/14/2023, 8:47 AM

## 2023-03-17 ENCOUNTER — HOSPITAL ENCOUNTER (OUTPATIENT)
Dept: PHYSICAL THERAPY | Age: 28
Setting detail: THERAPIES SERIES
Discharge: HOME | End: 2023-03-17
Payer: COMMERCIAL

## 2023-03-17 PROCEDURE — 97140 MANUAL THERAPY 1/> REGIONS: CPT

## 2023-03-17 PROCEDURE — 97112 NEUROMUSCULAR REEDUCATION: CPT

## 2023-03-17 PROCEDURE — 97016 VASOPNEUMATIC DEVICE THERAPY: CPT

## 2023-03-17 PROCEDURE — 97110 THERAPEUTIC EXERCISES: CPT

## 2023-03-17 NOTE — FLOWSHEET NOTE
Outpatient Physical Therapy  [place]           [x] Phone: [phone]   Fax: [phone]  [facility]           [] Phone: [phone]   Fax: [phone]        Physical Therapy Daily Treatment Note  Date:  3/17/2023    Patient Name:  Denilson Razo    :  1995  MRN: [id number]  Restrictions/Precautions: WBAT, weaning out of boot. Diagnosis:   Sprain of unspecified ligament of right ankle, initial encounter [S93.401A]    Date of Injury/Surgery: 22  Treatment Diagnosis:   right ankle pain/swelling, RLE weakness, impaired ankle AROM, antalgic gait  Insurance/Certification information:  [facility]  Referring Physician:  Reagan Dillon PA-C     PCP: Lennox Cliche, PA-C  Next Doctor Visit:  Unknown  Plan of care signed (Y/N):  Y  Outcome Measure:   22:  LEFS @ eval 25% disability  3/02/23:    LEFS @ Re-eval 42.5% disability  Visit# / total visits:       [facility] 12 sessions from 12/15-  Pain level:      0/10           Goals:        Patient goals: Patient goals: Return to PLOF and work w/o restrictions or pain  Long Term Goals  Time Frame for Long Term Goals: In 12 visits by 23, patient will  demonstrate compliance and independence w/HEP. Reports compliance 3/17  score at <= 5% disability on LEFS as indication of improved function w/daily activity. report return to full work duty activities w/no >= 3/10 pain. ambulate >= 1,200 ft in 6' w/o AD or ankle brace, normalized gait pattern and <= 2/10 ankle discomfort. ascend/descend 11 stairs, (1) HR light support, reciprocal pattern, normalized gait, <= 2/10 ankle discomfort. Summary of Evaluation:     Patient is a 33 y.o. male w/DX sprain unspecified ligament R ankle. Pt reports injuring his R ankle while on a medical run when he stepped out of the ambulance. His ankle rolled inward (weight taken through lateral foot.)  PLOF:  Independent all mobility and self-care, no ankle pain.   CURRENT LOF:  ankle throbbing pain, wearing air splint and ace wrap R ankle/foot, pain w/walking and any standing activities. Pt presents w/medial and lateral ankle pain/swelling, pain medial ankle/calf w/wbing, gait is antalgic, RLE weakness and impaired ankle ROM. No obvious joint laxity R ankle. Pt will benefit from physical therapy to address deficits related to R ankle injury which is limiting mobility and ability to work his full-time job w/o restrictions. 3/02/23:  Pt returned after a break in therapy d/t findings of ankle fx. Pt was placed in a rocker bottom boot and now to be weaned from the boot as tolerated. Will resume therapy as per initial evaluation/plan of care. Subjective:  Ritu Toro arrives to therapy stating that he is out of the boot, has been for a few days. He states that he has no pain in his ankle, just muscle soreness from exercising. Concerns are going up/down stairs and ladders. Any changes in Ambulatory Summary Sheet? None        Objective:  COVID screening questions were asked and patient attested that there had been no contact or symptoms     AROM DF 6°  Patient felt instability in ankle with BOSU marches but this improved with decreased speed of performance. Requires occasional light UE assist to regain balance during SLB on R.        Exercises: (No more than 4 columns)   Exercise/Equipment 3/14/2023 #4 3/17/2023 #5            WARM UP      Scifit 5' 5'           TABLE      4 way ankle  HEP  -    [place] board all directions -- -    Calf stretch with towel gastroc/soleus  Standing as below -    Heel/Toe raises seated with feet on floor  -- -             STANDING      Heel/Toe Raises  20* pain free range DL  20* DL     Gastroc/Soleus stretches  On incline board 2*30\" ea Incline 1' ea     Marches  2*20 steps on airex  BOSU 2*20 steps     BOSU step up and over laterally  Next  -    Hip abduction bilaterally  2*10 ea LE  -    Mini squat 20* On BOSU 2*10    Step up over fwd 6\" 10* 8\" 10*    Wobble board taps  10* ea dir  20* ea dir  SL A/P? Anterior lunge to 6\" step for increased DF  6\" 10* 6\" 10*    SLB   2*30\" floor, UR assist prn           PROPRIOCEPTION      Wobble board balance  30\" ea dir  1' ea dir     BOSU balance DL with ball toss   2*20 tosses                       MODALITIES      Vaso   10'               Other Therapeutic Activities/Education:  3/8/23: Advised patient to bring other shoe next session. Home Exercise Program:  continue with current       Manual Treatments: Posterior TC joint mobs, MWM as well for increased DF    Modalities:  Patient received vasocompression on their R ankle for swelling for 10 min on medium pressure. Patient had negative skin reaction afterwards. Girth measurements around extremity were pre: 33 cm malleoli circumf; 31 cm    See subjective and assessment for pre and post treatment patient reported pain levels. Communication with other providers:  none      Assessment:  Jagruti Moore is doing well overall with minimal pain in the ankle. His DF ROM is improving and he has been able to tolerate progression of strength and stability exercises in the clinic. He demonstrates decreased ankle stability on uneven surfaces and with SLB as compared to the opposite side. He will continue to benefit from skilled PT services to address these deficits so that he may return to his PLOF and work activities without fear of re-injury.  End session pain: 0/10      Plan for Next Session: ankle ev/in strengthening  to tolerance and pain management      Time In / Time Out:  2091/4532       If [facility] Please Indicate Time In/Out/Total Time  CPT Code Time in Time out Total Time   TE 08942  0925 0948 23   Man 41387  0900 0910 10   Vaso 70160  0948 0958 10   NR 03176  0910 0925 15                      Total for session    58       Timed Code/Total Treatment Minutes:  48'/58' 1 vaso 1 man 1 NR 1 TE     Next Progress Note due:  10 visits      Plan of Care Interventions:  [] Therapeutic Exercise  [] Modalities:  [] Therapeutic Activity     [] Ultrasound  [] Estim  [] Gait Training      [] Cervical Traction [] Lumbar Traction  [] Neuromuscular Re-education    [] Cold/hotpack [] Iontophoresis   [] Instruction in HEP      [] Vasopneumatic   [] Dry Needling    [] Manual Therapy               [] Aquatic Therapy              Electronically signed by:  Dylon Nicole PTA      3/17/2023, 8:44 AM

## 2023-03-21 ENCOUNTER — HOSPITAL ENCOUNTER (OUTPATIENT)
Dept: PHYSICAL THERAPY | Age: 28
Setting detail: THERAPIES SERIES
Discharge: HOME | End: 2023-03-21
Payer: COMMERCIAL

## 2023-03-21 PROCEDURE — 97110 THERAPEUTIC EXERCISES: CPT

## 2023-03-21 PROCEDURE — 97140 MANUAL THERAPY 1/> REGIONS: CPT

## 2023-03-21 PROCEDURE — 97016 VASOPNEUMATIC DEVICE THERAPY: CPT

## 2023-03-21 NOTE — FLOWSHEET NOTE
Iontophoresis   [] Instruction in HEP      [] Vasopneumatic   [] Dry Needling    [] Manual Therapy               [] Aquatic Therapy              Electronically signed by:  JESSE Martin 3/21/2023, 3:39 PM

## 2023-03-31 ENCOUNTER — HOSPITAL ENCOUNTER (OUTPATIENT)
Dept: PHYSICAL THERAPY | Age: 28
Setting detail: THERAPIES SERIES
Discharge: HOME | End: 2023-03-31
Payer: COMMERCIAL

## 2023-03-31 PROCEDURE — 97110 THERAPEUTIC EXERCISES: CPT

## 2023-03-31 PROCEDURE — 97016 VASOPNEUMATIC DEVICE THERAPY: CPT

## 2023-03-31 PROCEDURE — 97140 MANUAL THERAPY 1/> REGIONS: CPT

## 2023-03-31 NOTE — FLOWSHEET NOTE
MAN X 30' X 2;   TE X 15' X 1;   GR X 15' X 1    Next Progress Note due:  10 visits      Plan of Care Interventions:  [] Therapeutic Exercise  [] Modalities:  [] Therapeutic Activity     [] Ultrasound  [] Estim  [] Gait Training      [] Cervical Traction [] Lumbar Traction  [] Neuromuscular Re-education    [] Cold/hotpack [] Iontophoresis   [] Instruction in HEP      [] Vasopneumatic   [] Dry Needling    [] Manual Therapy               [] Aquatic Therapy              Electronically signed by:  Sherry Yanes II, PTA [id number]        3/31/2023, 9:28 AM

## 2023-04-04 ENCOUNTER — HOSPITAL ENCOUNTER (OUTPATIENT)
Dept: PHYSICAL THERAPY | Age: 28
Setting detail: THERAPIES SERIES
Discharge: HOME | End: 2023-04-04
Payer: COMMERCIAL

## 2023-04-04 PROCEDURE — 97530 THERAPEUTIC ACTIVITIES: CPT

## 2023-04-04 NOTE — FLOWSHEET NOTE
--     SLB  2*30\" floor           PROPRIOCEPTION      Wobble board balance  1' ea dir  B F/S 60\" each    BOSU balance DL with ball toss             RETURN TO WORK      Ladder in staircase   2x   Stairs   1x reciprocal   MODALITIES      Vaso  10'  15'              Other Therapeutic Activities/Education:  3/8/23: Advised patient to bring other shoe next session. Home Exercise Program:  continue with current       Manual Treatments: none      Modalities: none      Communication with other providers:  PN sent      Assessment:  Pt tolerated treatment well today. PT and pt plan to see one more visit after going back to duty full time with no restrictions to assess performance at work. 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  End pain: 0/10          Time In / Time Out:  1531/1500       If [facility] Please Indicate Time In/Out/Total Time  CPT Code Time in Time out Total Time   TE 49097       Man 57556       Vaso 87289       NR 17791       TA 49779 7330 1500 29                Total for session    29       Timed Code/Total Treatment Minutes:  29/29: 2 TA            Electronically signed by:  Deven Delgado, PT DPT     4/4/2023, 2:06 PM

## 2023-04-04 NOTE — PROGRESS NOTES
Outpatient Physical Therapy           [place]           [x] Phone: [phone]   Fax: [phone]  [facility]           [] Phone: [phone]   Fax: [phone]      To: Lizett Sharp PA-C     From: Poornima Sweet, PT, DPT     Patient: Dayan Sanchez                    : 1995  Diagnosis:  Sprain of unspecified ligament of right ankle, initial encounter Lorena Lugo          Date: 2023  [x]  Progress Note                []  Discharge Note    Evaluation Date:  22 reeval 3/2/23   Total Visits to date:   11 Cancels/No-shows to date:  1    Subjective:  pt reports that he has been out of a boot/brace for a few weeks now. He notes that he feels okay but is just concerned a little bit about his balance. LEFS: 76/80              Objective/Significant Findings At Last Visit/Comments:    6 minute walk test: 1649 ft  Ascend/descend stairs reciprocal pattern, no handrail  Ladder in staircase: 2x no pain    Assessment:   Pt tolerated treatment well today. PT and pt plan to see one more visit after going back to duty full time with no restrictions to assess performance at work. 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 Goal Status:  [x] Achieved [] Partially Achieved  [x] Not Achieved     Patient goals: Patient goals: Return to PLOF and work w/o restrictions or pain  Long Term Goals  Time Frame for Long Term Goals: In 12 visits by 23, patient will  demonstrate compliance and independence w/HEP. Reports compliance 3/17  score at <= 5% disability on LEFS as indication of improved function w/daily activity. MET 4/4  report return to full work duty activities w/no >= 3/10 pain.  MET 4/4  ambulate >= 1,200 ft in 6' w/o AD or ankle brace, normalized gait pattern and <= 2/10 ankle

## 2023-04-06 NOTE — PLAN OF CARE
Patients Plan of Care was received and signed. Signed POC was scanned and placed in the patients chart.     Michael Padron

## 2024-04-30 ENCOUNTER — HOSPITAL ENCOUNTER (OUTPATIENT)
Dept: PHYSICAL THERAPY | Age: 29
Setting detail: THERAPIES SERIES
Discharge: HOME | End: 2024-04-30
Payer: COMMERCIAL

## 2024-04-30 PROCEDURE — 97161 PT EVAL LOW COMPLEX 20 MIN: CPT

## 2024-04-30 PROCEDURE — 97110 THERAPEUTIC EXERCISES: CPT

## 2024-04-30 NOTE — FLOWSHEET NOTE
Assessment: Pt is a 30 yo male that presents to therapy with complaints of R chronic ankle pain and instability. Initial injury was 11/25/22 and was in PT twice for this. His ankle gave way in January and has been on desk duty since. He is a  at [employer], does not have a return to full duty date yet. He likes to play basketball and would also like to be able to return to playing. He experiences instability when walking and has the most pain with driving. Pt demo impaired R ankle ROM/strength, balance, activity tolerance, work tolerance, increased pain and increased edema. Pt would benefit from continued skilled physical therapy to address impairments and limitations, progress toward goal completion, promote independence with ADLs, and prevent further injury. End pain: 0/10      Plan for Next Session:   Specific Instructions for Next Treatment: R ankle ROM/strength, balance on uneven surfaces, return to work, DNT?    Time In / Time Out:    0800/0840       If [facility] Please Indicate Time In/Out/Total Time  CPT Code Time in Time out Total Time   TE 59561   0830  0840  10                                          Low eval   0800  0830  30   Total for session      40       Timed Code/Total Treatment Minutes:  10/40: 1 TE 1 low eval       Next Progress Note due:  10th visit       Plan of Care Interventions:  [x] Therapeutic Exercise  [x] Modalities:  [x] Therapeutic Activity     [] Ultrasound  [] Estim  [] Gait Training      [] Cervical Traction [] Lumbar Traction  [x] Neuromuscular Re-education    [] Cold/hotpack [] Iontophoresis   [x] Instruction in HEP      [x] Vasopneumatic   [x] Dry Needling    [x] Manual Therapy               [] Aquatic Therapy              Electronically signed by: Mallory Hernandez, PT, DPT, Cert. DN    4/30/2024, 10:27 AM

## 2024-05-02 ENCOUNTER — HOSPITAL ENCOUNTER (OUTPATIENT)
Dept: PHYSICAL THERAPY | Age: 29
Setting detail: THERAPIES SERIES
Discharge: HOME | End: 2024-05-02
Payer: COMMERCIAL

## 2024-05-02 PROCEDURE — 97110 THERAPEUTIC EXERCISES: CPT

## 2024-05-02 PROCEDURE — 97016 VASOPNEUMATIC DEVICE THERAPY: CPT

## 2024-05-02 NOTE — FLOWSHEET NOTE
Outpatient Physical Therapy  [place]           [x] Phone: [phone]   Fax: [phone]  [place]           [] Phone: [phone]   Fax: [phone]        Physical Therapy Daily Treatment Note  Date:  2024    Patient Name:  Alex Sharma    :  1995  MRN: [id number]  Restrictions/Precautions: Restrictions/Precautions: General Precautions        Diagnosis:   Sprain of deltoid ligament of right ankle, initial encounter [S93.421A]    Date of Injury: 22  Treatment Diagnosis:  chronic R ankle instability  Insurance/Certification information: [facility]- 18 by 24  Referring Physician:  Donald Marcelo MD     PCP: Tucker Mata PA-C  Next Doctor Visit:  24  Estimated return to full duty: N/A yet   Plan of care signed (Y/N):  sent   Outcome Measure: LEFS: 65/80  Visit# / total visits:  2/10  Pain level: 0/10   Goals:     Patient goals: more stability in ankle  Short term goals  Time Frame for Short term goals: refer to LT                 Long Term Goals  Time Frame for Long Term Goals: 10 visits  Pt will report overall improvement in condition by 50% or more  pt will improve LEFS to 70/80 or more to show subjective improvement  pt will improve R ankle plantarflexion/dorsiflexion strength to 5/5 for improved driving tolerance  pt will improve R ankle inversion/eversion strength to 4+/5 for improved ambulating on uneven surfaces  pt will be able to climb a ladder with feeling little to no instability of the R ankle for return to work full duty      Summary of Evaluation:  Assessment: Pt is a 28 yo male that presents to therapy with complaints of R chronic ankle pain and instability. Initial injury was 22 and was in PT twice for this. His ankle gave way in January and has been on desk duty since. He is a  at [place] [employer], does not have a return to full duty date yet. He likes to play basketball and would also like to be able to return to playing. He

## 2024-05-07 ENCOUNTER — HOSPITAL ENCOUNTER (OUTPATIENT)
Dept: PHYSICAL THERAPY | Age: 29
Setting detail: THERAPIES SERIES
Discharge: HOME | End: 2024-05-07
Payer: COMMERCIAL

## 2024-05-07 PROCEDURE — 20561 NDL INSJ W/O NJX 3+ MUSC: CPT

## 2024-05-07 NOTE — FLOWSHEET NOTE
Outpatient Physical Therapy  [place]           [x] Phone: [phone]   Fax: [phone]  [place]           [] Phone: [phone]   Fax: [phone]        Physical Therapy Daily Treatment Note  Date:  2024    Patient Name:  Alex Sharma    :  1995  MRN: [id number]  Restrictions/Precautions: Restrictions/Precautions: General Precautions        Diagnosis:   Sprain of deltoid ligament of right ankle, initial encounter [S93.421A]    Date of Injury: 22  Treatment Diagnosis:  chronic R ankle instability  Insurance/Certification information: [facility]- 18 by 24  Referring Physician:  Donald Marcelo MD     PCP: Tucker Mata PA-C  Next Doctor Visit:  24  Estimated return to full duty: N/A yet   Plan of care signed (Y/N):  yes  Outcome Measure: LEFS: 65/80  Visit# / total visits:  3/10  Pain level: 0/10   Goals:     Patient goals: more stability in ankle  Short term goals  Time Frame for Short term goals: refer to [facility]                 Long Term Goals  Time Frame for Long Term Goals: 10 visits  Pt will report overall improvement in condition by 50% or more  pt will improve LEFS to 70/80 or more to show subjective improvement  pt will improve R ankle plantarflexion/dorsiflexion strength to 5/5 for improved driving tolerance  pt will improve R ankle inversion/eversion strength to 4+/5 for improved ambulating on uneven surfaces  pt will be able to climb a ladder with feeling little to no instability of the R ankle for return to work full duty      Summary of Evaluation:  Assessment: Pt is a 30 yo male that presents to therapy with complaints of R chronic ankle pain and instability. Initial injury was 22 and was in PT twice for this. His ankle gave way in January and has been on desk duty since. He is a  at [employer], does not have a return to full duty date yet. He likes to play basketball and would also like to be able to return to playing. He

## 2024-05-09 ENCOUNTER — HOSPITAL ENCOUNTER (OUTPATIENT)
Dept: PHYSICAL THERAPY | Age: 29
Setting detail: THERAPIES SERIES
Discharge: HOME | End: 2024-05-09
Payer: COMMERCIAL

## 2024-05-09 PROCEDURE — 97110 THERAPEUTIC EXERCISES: CPT

## 2024-05-09 PROCEDURE — 97112 NEUROMUSCULAR REEDUCATION: CPT

## 2024-05-09 PROCEDURE — 97016 VASOPNEUMATIC DEVICE THERAPY: CPT

## 2024-05-09 NOTE — FLOWSHEET NOTE
proceed w/DNT.        Home Exercise Program:  Issued, practiced and pt demo ability to perform on eval date  4/29: Blue TB given   Access Code: [email or web address]   URL: [email or web address]   Date: 04/30/2024   Prepared by: Meagan Hernandez, PT, DPT, Cert. DN       Exercises   - Long Sitting Ankle Eversion with Resistance  - 2 x daily - 7 x weekly - 2 sets - 10 reps DLBluTB  - Long Sitting Ankle Dorsiflexion with Anchored Resistance  - 2 x daily - 7 x weekly - 2 sets - 10 reps DLBluTB  - Long Sitting Ankle Inversion with Resistance  - 2 x daily - 7 x weekly - 2 sets - 10 reps DLBluTB  - Long Sitting Ankle Plantar Flexion with Resistance  - 2 x daily - 7 x weekly - 2 sets - 10 reps DLBlkTB  - Single Leg Stance  - 2 x daily - 7 x weekly - 2 sets - 30 hold       Manual Treatments:   Modalities:  none      Communication with other providers:  none this date      Assessment:  Pt tolerates tx session well without adverse reactions or complications. Tolerates added exercises well without issue or increased pain. Pt would benefit from continued skilled physical therapy to address remaining impairments and limitations, progress toward goal completion, promote independence with ADLs, and prevent further injury.   end pain: 0/10 soreness/fatigue        Plan for Next Session:   Specific Instructions for Next Treatment: R ankle ROM/strength, balance on uneven surfaces, return to work, DNT?    Time In / Time Out:    1615 / 1705       If BWC Please Indicate Time In/Out/Total Time  CPT Code Time in Time out Total Time   TE 86113   1615 1645 30   Vaso  1655 1705 10   DNT 74360        Neuro  1645 1655 10               Low eval        Total for session    50       Timed Code/Total Treatment Minutes:   40'/50'   2 TE  1 NR  1 Vaso       Next Progress Note due:  10th visit       Plan of Care Interventions:  [x] Therapeutic Exercise  [x] Modalities:  [x] Therapeutic Activity     [] Ultrasound  [] Estim  [] Gait Training      [] Cervical

## 2024-05-14 ENCOUNTER — HOSPITAL ENCOUNTER (OUTPATIENT)
Dept: PHYSICAL THERAPY | Age: 29
Setting detail: THERAPIES SERIES
Discharge: HOME | End: 2024-05-14
Payer: COMMERCIAL

## 2024-05-14 PROCEDURE — 97112 NEUROMUSCULAR REEDUCATION: CPT

## 2024-05-14 PROCEDURE — 20561 NDL INSJ W/O NJX 3+ MUSC: CPT

## 2024-05-14 NOTE — FLOWSHEET NOTE
8/33: 1 NR 1 DNT 20561      Next Progress Note due:  10th visit       Plan of Care Interventions:  [x] Therapeutic Exercise  [x] Modalities:  [x] Therapeutic Activity     [] Ultrasound  [] Estim  [] Gait Training      [] Cervical Traction [] Lumbar Traction  [x] Neuromuscular Re-education    [] Cold/hotpack [] Iontophoresis   [x] Instruction in HEP      [x] Vasopneumatic   [x] Dry Needling    [x] Manual Therapy               [] Aquatic Therapy              Electronically signed by: Mallory Hernandez PT, DPT, Cert. DN     5/14/2024, 8:29 AM

## 2024-05-17 ENCOUNTER — HOSPITAL ENCOUNTER (OUTPATIENT)
Dept: PHYSICAL THERAPY | Age: 29
Setting detail: THERAPIES SERIES
Discharge: HOME | End: 2024-05-17
Payer: COMMERCIAL

## 2024-05-17 PROCEDURE — 97110 THERAPEUTIC EXERCISES: CPT

## 2024-05-17 PROCEDURE — 97016 VASOPNEUMATIC DEVICE THERAPY: CPT

## 2024-05-17 PROCEDURE — 20561 NDL INSJ W/O NJX 3+ MUSC: CPT

## 2024-05-17 NOTE — FLOWSHEET NOTE
Outpatient Physical Therapy  [place]           [x] Phone: [phone]   Fax: [phone]  [place]           [] Phone: [phone]   Fax: [phone]        Physical Therapy Daily Treatment Note  Date:  2024    Patient Name:  Alex Sharma    :  1995  MRN: [id number]  Restrictions/Precautions: Restrictions/Precautions: General Precautions        Diagnosis:   Sprain of deltoid ligament of right ankle, initial encounter [S93.421A]    Date of Injury: 22  Treatment Diagnosis:  chronic R ankle instability  Insurance/Certification information: [facility]- 18 by 24  Referring Physician:  Donald Marcelo MD     PCP: Tucker Mata PA-C  Next Doctor Visit:  24  Estimated return to full duty: N/A yet   Plan of care signed (Y/N):  yes  Outcome Measure: LEFS: 65/80  Visit# / total visits:  6/10  Pain level: 0/10   Goals:     Patient goals: more stability in ankle  Short term goals  Time Frame for Short term goals: refer to [facility]         Long Term Goals  Time Frame for Long Term Goals: 10 visits  Pt will report overall improvement in condition by 50% or more  pt will improve LEFS to 70/80 or more to show subjective improvement  pt will improve R ankle plantarflexion/dorsiflexion strength to 5/5 for improved driving tolerance  pt will improve R ankle inversion/eversion strength to 4+/5 for improved ambulating on uneven surfaces  pt will be able to climb a ladder with feeling little to no instability of the R ankle for return to work full duty      Summary of Evaluation:  Assessment: Pt is a 28 yo male that presents to therapy with complaints of R chronic ankle pain and instability. Initial injury was 22 and was in PT twice for this. His ankle gave way in January and has been on desk duty since. He is a  at [place] [employer], does not have a return to full duty date yet. He likes to play basketball and would also like to be able to return to playing. He experiences

## 2024-05-21 ENCOUNTER — HOSPITAL ENCOUNTER (OUTPATIENT)
Dept: PHYSICAL THERAPY | Age: 29
Setting detail: THERAPIES SERIES
Discharge: HOME | End: 2024-05-21
Payer: COMMERCIAL

## 2024-05-21 PROCEDURE — 97016 VASOPNEUMATIC DEVICE THERAPY: CPT

## 2024-05-21 PROCEDURE — 20561 NDL INSJ W/O NJX 3+ MUSC: CPT

## 2024-05-21 PROCEDURE — 97530 THERAPEUTIC ACTIVITIES: CPT

## 2024-05-21 NOTE — FLOWSHEET NOTE
Outpatient Physical Therapy  [place]           [x] Phone: [phone]   Fax: [phone]  [place]           [] Phone: [phone]   Fax: [phone]        Physical Therapy Daily Treatment Note  Date:  2024    Patient Name:  Alex Sharma    :  1995  MRN: [id number]  Restrictions/Precautions: Restrictions/Precautions: General Precautions        Diagnosis:   Sprain of deltoid ligament of right ankle, initial encounter [S93.421A]    Date of Injury: 22  Treatment Diagnosis:  chronic R ankle instability  Insurance/Certification information: [facility]- 18 by 24  Referring Physician:  Donald Marcelo MD     PCP: Tucker Mata PA-C  Next Doctor Visit:  24  Estimated return to full duty: N/A yet   Plan of care signed (Y/N):  yes  Outcome Measure: LEFS: 65/80  Visit# / total visits:  7/10  Pain level: 0/10   Goals:     Patient goals: more stability in ankle  Short term goals  Time Frame for Short term goals: refer to [facility]         Long Term Goals  Time Frame for Long Term Goals: 10 visits  Pt will report overall improvement in condition by 50% or more  pt will improve LEFS to 70/80 or more to show subjective improvement  pt will improve R ankle plantarflexion/dorsiflexion strength to 5/5 for improved driving tolerance  pt will improve R ankle inversion/eversion strength to 4+/5 for improved ambulating on uneven surfaces  pt will be able to climb a ladder with feeling little to no instability of the R ankle for return to work full duty      Summary of Evaluation:  Assessment: Pt is a 28 yo male that presents to therapy with complaints of R chronic ankle pain and instability. Initial injury was 22 and was in PT twice for this. His ankle gave way in January and has been on desk duty since. He is a  at [place] [employer], does not have a return to full duty date yet. He likes to play basketball and would also like to be able to return to playing. He experiences

## 2024-05-24 ENCOUNTER — HOSPITAL ENCOUNTER (OUTPATIENT)
Dept: PHYSICAL THERAPY | Age: 29
Setting detail: THERAPIES SERIES
Discharge: HOME | End: 2024-05-24
Payer: COMMERCIAL

## 2024-05-24 PROCEDURE — 97530 THERAPEUTIC ACTIVITIES: CPT

## 2024-05-24 PROCEDURE — 97016 VASOPNEUMATIC DEVICE THERAPY: CPT

## 2024-05-24 NOTE — FLOWSHEET NOTE
Outpatient Physical Therapy  [place]           [x] Phone: [phone]   Fax: [phone]  [place]           [] Phone: [phone]   Fax: [phone]        Physical Therapy Daily Treatment Note  Date:  2024    Patient Name:  Alex Sharma    :  1995  MRN: [id number]  Restrictions/Precautions: Restrictions/Precautions: General Precautions        Diagnosis:   Sprain of deltoid ligament of right ankle, initial encounter [S93.421A]    Date of Injury: 22  Treatment Diagnosis:  chronic R ankle instability  Insurance/Certification information: [facility]- 18 by 24  Referring Physician:  Donald Marcelo MD     PCP: Tucker Mata PA-C  Next Doctor Visit:  24  Estimated return to full duty: N/A yet   Plan of care signed (Y/N):  yes  Outcome Measure: LEFS: 65/80  Visit# / total visits:  8/10  Pain level: 0/10   Goals:     Patient goals: more stability in ankle  Short term goals  Time Frame for Short term goals: refer to [facility]         Long Term Goals  Time Frame for Long Term Goals: 10 visits  Pt will report overall improvement in condition by 50% or more  pt will improve LEFS to 70/80 or more to show subjective improvement  pt will improve R ankle plantarflexion/dorsiflexion strength to 5/5 for improved driving tolerance  pt will improve R ankle inversion/eversion strength to 4+/5 for improved ambulating on uneven surfaces  pt will be able to climb a ladder with feeling little to no instability of the R ankle for return to work full duty      Summary of Evaluation:  Assessment: Pt is a 30 yo male that presents to therapy with complaints of R chronic ankle pain and instability. Initial injury was 22 and was in PT twice for this. His ankle gave way in January and has been on desk duty since. He is a  at [place] [employer], does not have a return to full duty date yet. He likes to play basketball and would also like to be able to return to playing. He experiences

## 2024-05-28 ENCOUNTER — HOSPITAL ENCOUNTER (OUTPATIENT)
Dept: PHYSICAL THERAPY | Age: 29
Setting detail: THERAPIES SERIES
Discharge: HOME | End: 2024-05-28
Payer: COMMERCIAL

## 2024-05-28 PROCEDURE — 97530 THERAPEUTIC ACTIVITIES: CPT

## 2024-05-28 PROCEDURE — 97110 THERAPEUTIC EXERCISES: CPT

## 2024-05-28 PROCEDURE — 97016 VASOPNEUMATIC DEVICE THERAPY: CPT

## 2024-05-28 PROCEDURE — 97112 NEUROMUSCULAR REEDUCATION: CPT

## 2024-05-28 NOTE — FLOWSHEET NOTE
Outpatient Physical Therapy  [place]           [x] Phone: [phone]   Fax: [phone]  [place]           [] Phone: [phone]   Fax: [phone]        Physical Therapy Daily Treatment Note  Date:  2024    Patient Name:  Alex Sharma    :  1995  MRN: [id number]  Restrictions/Precautions: Restrictions/Precautions: General Precautions        Diagnosis:   Sprain of deltoid ligament of right ankle, initial encounter [S93.421A]    Date of Injury: 22  Treatment Diagnosis:  chronic R ankle instability  Insurance/Certification information: [facility]- 18 by 24  Referring Physician:  Donald Marcelo MD     PCP: Tucker Mata PA-C  Next Doctor Visit:  24  Estimated return to full duty: N/A yet   Plan of care signed (Y/N):  yes  Outcome Measure: LEFS: 65/80  Visit# / total visits:  /10  Pain level: 0/10   Goals:     Patient goals: more stability in ankle  Short term goals  Time Frame for Short term goals: refer to [facility]         Long Term Goals  Time Frame for Long Term Goals: 10 visits  Pt will report overall improvement in condition by 50% or more  pt will improve LEFS to 70/80 or more to show subjective improvement  pt will improve R ankle plantarflexion/dorsiflexion strength to 5/5 for improved driving tolerance  pt will improve R ankle inversion/eversion strength to 4+/5 for improved ambulating on uneven surfaces  pt will be able to climb a ladder with feeling little to no instability of the R ankle for return to work full duty      Summary of Evaluation:  Assessment: Pt is a 30 yo male that presents to therapy with complaints of R chronic ankle pain and instability. Initial injury was 22 and was in PT twice for this. His ankle gave way in January and has been on desk duty since. He is a  at [place] [employer], does not have a return to full duty date yet. He likes to play basketball and would also like to be able to return to playing. He experiences

## 2024-05-28 NOTE — PLAN OF CARE
Outpatient Physical Therapy           [place]           [x] Phone: [phone]   Fax: [phone]  [place]           [] Phone: [phone]   Fax: [phone]      To: Donald Marcelo MD     From: Mallory Hernandez, PT, DPT, Cert. DN      Patient: Alex Sharma                    : 1995  Diagnosis:  Sprain of deltoid ligament of right ankle, initial encounter [S93.421A]        Treatment Diagnosis:  chronic R ankle instability      Date: 2024      Dear Dr. Marcelo,     Alex Sharma will be in your office on 24 for a follow up appt and relates that returning back to full duty will probably be discussed. I just wanted to give you an update on how things are going with PT. Alex has been having some pinching on the top of his foot that sounds like it could be pinched nerve s/s from what he describes. He has been on light duty and PT feels that he is ready to return full duty at work, depending on MD approval. PT recommended wearing his brace to start out with and to be careful with jumping out of trucks and landing softly. Plan to extend his C9 end date on Friday the  as he will have 8 more visits to complete. PT suggested that he continue with therapy for these 8 more visits so as to be sure return to work has been going well, pt agreed. Overall pt has been doing well with return to work activities in therapy and is able to complete them without any LOB secondary to instability.     Just wanted to give you an insight on how he has been doing with therapy. I plan to see him until his C9 runs out of visits regardless if he returns to full duty or not.       Thank you!!    Mallory Hernandez, PT, DPT, Cert. DN

## 2024-05-31 ENCOUNTER — HOSPITAL ENCOUNTER (OUTPATIENT)
Dept: PHYSICAL THERAPY | Age: 29
Setting detail: THERAPIES SERIES
Discharge: HOME | End: 2024-05-31
Payer: COMMERCIAL

## 2024-05-31 PROCEDURE — 97110 THERAPEUTIC EXERCISES: CPT

## 2024-05-31 PROCEDURE — 97016 VASOPNEUMATIC DEVICE THERAPY: CPT

## 2024-05-31 NOTE — PROGRESS NOTES
Outpatient Physical Therapy           [place]           [x] Phone: [phone]   Fax: [phone]  [place]           [] Phone: [phone]   Fax: [phone]      To: Donald Marcelo MD     From: Mallory Hernandez, PT, DPT, Cert. DN      Patient: Alex Sharma                    : 1995  Diagnosis:  Sprain of deltoid ligament of right ankle, initial encounter [S93.421A]        Treatment Diagnosis:   chronic R ankle instability     Date: 2024  [x]  Progress Note                []  Discharge Note    Evaluation Date:  24   Total Visits to date:   10 Cancels/No-shows to date:  0    Subjective:    pt reports that MD cleared him for full duty starting sometime next week once all the paperwork is finished. He notes that they took an xray and everything looks good with that, MD's only concern is his conditioning for the job nut Alex reports that he is working on that. Will keep remaining 8 visits allotted for focus on ankle after returning full duty and focusing on any concerns that arise.     Plan of Care/Treatment to date:  [x] Therapeutic Exercise    [x] Modalities:  [x] Therapeutic Activity     [] Ultrasound  [] Electrical Stimulation  [] Gait Training      [] Cervical Traction   [] Lumbar Traction  [x] Neuromuscular Re-education  [] Cold/hotpack [] Iontophoresis  [x] Instruction in HEP      Other:  [x] Manual Therapy       [x]  Vasopneumatic  [] Aquatic Therapy       [x]   Dry Needle Therapy                      Objective/Significant Findings At Last Visit/Comments:    Good tolerance to exercises today, no LOB     Assessment:     Pt tolerated treatment well today. Alex is doing well with therapy, he was just returned to full duty starting next week. PT will continue to see him for his allotted 8 more visits to ensure returning to work is going okay. Asking [facility] for dry needling to be covered and a date extension.  Pt would benefit from continued skilled physical therapy to address

## 2024-05-31 NOTE — FLOWSHEET NOTE
X3 ea     Back pedaling with jog fwd X1  X5 laps     MODALITIES      Vaso  10'  10'  10'    DNT           Other Therapeutic Activities/Education:  HEP and importance of completion, POC and goals, anatomy and physiology related to condition  DNT Acknowledgement and Notice of Non-Coverage/ABN forms provided, reviewed and signed.    Pt notified that mild needle soreness x 1-2 days may be present and is an expected side effect.  Mild bruising at needle site(s) may also occur.  Patient wished to proceed w/DNT.        Home Exercise Program:  Issued, practiced and pt demo ability to perform on eval date  4/29: Blue TB given   Access Code: [email or web address]   URL: [email or web address]   Date: 04/30/2024   Prepared by: Meagan Hernandez, PT, DPT, Cert. DN       Exercises   - Long Sitting Ankle Eversion with Resistance  - 2 x daily - 7 x weekly - 2 sets - 10 reps DLBluTB  - Long Sitting Ankle Dorsiflexion with Anchored Resistance  - 2 x daily - 7 x weekly - 2 sets - 10 reps DLBluTB  - Long Sitting Ankle Inversion with Resistance  - 2 x daily - 7 x weekly - 2 sets - 10 reps DLBluTB  - Long Sitting Ankle Plantar Flexion with Resistance  - 2 x daily - 7 x weekly - 2 sets - 10 reps DLBlkTB  - Single Leg Stance  - 2 x daily - 7 x weekly - 2 sets - 30 hold     5/24: running on treadmill 5 min a day to build stamina   5/28: towel crunches, marble , PF stretching     Manual Treatments: none        Modalities:  10' vaso R ankle  sitting low pressure: no adverse skin reactions or conditions after tx        Communication with other providers:  PN and date ext sent       Assessment: Pt tolerated treatment well today. Alex is doing well with therapy, he was just returned to full duty starting next week. PT will continue to see him for his allotted 8 more visits to ensure returning to work is going okay. Asking [facility] for dry needling to be covered and a date extension.  Pt would benefit from continued skilled physical therapy to address

## 2024-06-04 ENCOUNTER — HOSPITAL ENCOUNTER (OUTPATIENT)
Dept: PHYSICAL THERAPY | Age: 29
Setting detail: THERAPIES SERIES
Discharge: HOME | End: 2024-06-04
Payer: COMMERCIAL

## 2024-06-04 PROCEDURE — 20561 NDL INSJ W/O NJX 3+ MUSC: CPT

## 2024-06-04 PROCEDURE — 97140 MANUAL THERAPY 1/> REGIONS: CPT

## 2024-06-04 PROCEDURE — 97016 VASOPNEUMATIC DEVICE THERAPY: CPT

## 2024-06-04 NOTE — FLOWSHEET NOTE
Outpatient Physical Therapy  [place]           [x] Phone: [phone]   Fax: [phone]  [place]           [] Phone: [phone]   Fax: [phone]        Physical Therapy Daily Treatment Note  Date:  2024    Patient Name:  Alex Sharma    :  1995  MRN: [id number]  Restrictions/Precautions: Restrictions/Precautions: General Precautions        Diagnosis:   Sprain of deltoid ligament of right ankle, initial encounter [S93.421A]    Date of Injury: 22  Treatment Diagnosis:  chronic R ankle instability  Insurance/Certification information: [facility]- 18 by 24  Referring Physician:  Donald Marcelo MD     PCP: Tucker Mata PA-C  Next Doctor Visit:  24  Estimated return to full duty: N/A yet   Plan of care signed (Y/N):  yes  Outcome Measure: LEFS: 65/80  Visit# / total visits:    Pain level: 0/10   Goals:     Patient goals: more stability in ankle progressing   Short term goals  Time Frame for Short term goals: refer to [facility]         Long Term Goals  Time Frame for Long Term Goals: 10 visits  Pt will report overall improvement in condition by 50% or more  pt will improve LEFS to 70/80 or more to show subjective improvement  pt will improve R ankle plantarflexion/dorsiflexion strength to 5/5 for improved driving tolerance progressing   pt will improve R ankle inversion/eversion strength to 4+/5 for improved ambulating on uneven surfaces progressing   pt will be able to climb a ladder with feeling little to no instability of the R ankle for return to work full duty will attempt next visit      Summary of Evaluation:  Assessment: Pt is a 28 yo male that presents to therapy with complaints of R chronic ankle pain and instability. Initial injury was 22 and was in PT twice for this. His ankle gave way in January and has been on desk duty since. He is a  at [employer] [employer], does not have a return to full duty date yet. He likes to play

## 2024-06-07 ENCOUNTER — HOSPITAL ENCOUNTER (OUTPATIENT)
Dept: PHYSICAL THERAPY | Age: 29
Discharge: HOME | End: 2024-06-07
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
## 2024-06-10 ENCOUNTER — HOSPITAL ENCOUNTER (OUTPATIENT)
Dept: PHYSICAL THERAPY | Age: 29
Setting detail: THERAPIES SERIES
Discharge: HOME | End: 2024-06-10
Payer: COMMERCIAL

## 2024-06-10 PROCEDURE — 97016 VASOPNEUMATIC DEVICE THERAPY: CPT

## 2024-06-10 PROCEDURE — 97110 THERAPEUTIC EXERCISES: CPT

## 2024-06-10 PROCEDURE — 20561 NDL INSJ W/O NJX 3+ MUSC: CPT

## 2024-06-10 NOTE — FLOWSHEET NOTE
Outpatient Physical Therapy  [place]           [x] Phone: [phone]   Fax: [phone]  [place]           [] Phone: [phone]   Fax: [phone]        Physical Therapy Daily Treatment Note  Date:  6/10/2024    Patient Name:  Alex Sharma    :  1995  MRN: [id number]  Restrictions/Precautions: Restrictions/Precautions: General Precautions        Diagnosis:   Sprain of deltoid ligament of right ankle, initial encounter [S93.421A]    Date of Injury: 22  Treatment Diagnosis:  chronic R ankle instability  Insurance/Certification information: [facility]- 18 by 24  Referring Physician:  Donald Marcelo MD     PCP: Tucker Mata PA-C  Next Doctor Visit:  24  Plan of care signed (Y/N):  yes  Outcome Measure: LEFS: 65/80  Visit# / total visits:    Pain level: 0/10   Goals:     Patient goals: more stability in ankle progressing   Short term goals  Time Frame for Short term goals: refer to [facility]         Long Term Goals  Time Frame for Long Term Goals: 10 visits  Pt will report overall improvement in condition by 50% or more  pt will improve LEFS to 70/80 or more to show subjective improvement  pt will improve R ankle plantarflexion/dorsiflexion strength to 5/5 for improved driving tolerance progressing   pt will improve R ankle inversion/eversion strength to 4+/5 for improved ambulating on uneven surfaces progressing   pt will be able to climb a ladder with feeling little to no instability of the R ankle for return to work full duty       Summary of Evaluation:  Assessment: Pt is a 28 yo male that presents to therapy with complaints of R chronic ankle pain and instability. Initial injury was 22 and was in PT twice for this. His ankle gave way in January and has been on desk duty since. He is a  at [employer], does not have a return to full duty date yet. He likes to play basketball and would also like to be able to return to playing. He

## 2024-06-14 ENCOUNTER — HOSPITAL ENCOUNTER (OUTPATIENT)
Dept: PHYSICAL THERAPY | Age: 29
Setting detail: THERAPIES SERIES
Discharge: HOME | End: 2024-06-14
Payer: COMMERCIAL

## 2024-06-14 PROCEDURE — 97112 NEUROMUSCULAR REEDUCATION: CPT

## 2024-06-14 PROCEDURE — 97110 THERAPEUTIC EXERCISES: CPT

## 2024-06-14 PROCEDURE — 97016 VASOPNEUMATIC DEVICE THERAPY: CPT

## 2024-06-14 NOTE — FLOWSHEET NOTE
Outpatient Physical Therapy  [place]           [x] Phone: [phone]   Fax: [phone]  [place]           [] Phone: [phone]   Fax: [phone]        Physical Therapy Daily Treatment Note  Date:  2024    Patient Name:  Alex Sharma    :  1995  MRN: [id number]  Restrictions/Precautions: Restrictions/Precautions: General Precautions        Diagnosis:   Sprain of deltoid ligament of right ankle, initial encounter [S93.421A]    Date of Injury: 22  Treatment Diagnosis:  chronic R ankle instability  Insurance/Certification information: [facility]- 18 by 24  Referring Physician:  Donald Marcelo MD     PCP: Tucker Mata PA-C  Next Doctor Visit:  24  Plan of care signed (Y/N):  yes  Outcome Measure: LEFS: 65/80  Visit# / total visits:    Pain level: 0/10   Goals:     Patient goals: more stability in ankle progressing   Short term goals  Time Frame for Short term goals: refer to [facility]         Long Term Goals  Time Frame for Long Term Goals: 10 visits  Pt will report overall improvement in condition by 50% or more  pt will improve LEFS to 70/80 or more to show subjective improvement  pt will improve R ankle plantarflexion/dorsiflexion strength to 5/5 for improved driving tolerance progressing   pt will improve R ankle inversion/eversion strength to 4+/5 for improved ambulating on uneven surfaces progressing   pt will be able to climb a ladder with feeling little to no instability of the R ankle for return to work full duty       Summary of Evaluation:  Assessment: Pt is a 30 yo male that presents to therapy with complaints of R chronic ankle pain and instability. Initial injury was 22 and was in PT twice for this. His ankle gave way in January and has been on desk duty since. He is a  at [employer], does not have a return to full duty date yet. He likes to play basketball and would also like to be able to return to playing. He

## 2024-06-17 ENCOUNTER — HOSPITAL ENCOUNTER (OUTPATIENT)
Dept: PHYSICAL THERAPY | Age: 29
Discharge: HOME | End: 2024-06-17

## 2024-06-17 NOTE — FLOWSHEET NOTE
Physical Therapy  Cancellation/No-show Note  Patient Name:  Alex Sharma  :  1995   Date:  2024  Cancelled visits to date: 2  No-shows to date: 0    For today's appointment patient:  [x]  Cancelled  []  Rescheduled appointment  []  No-show     Reason given by patient:  []  Patient ill  []  Conflicting appointment  []  No transportation    [x]  Conflict with work  []  No reason given  []  Other:     Comments:  got called into work     Electronically signed by:  Mallory Hernandez, PT, DPT, Cert. DN

## 2024-06-28 ENCOUNTER — HOSPITAL ENCOUNTER (OUTPATIENT)
Dept: PHYSICAL THERAPY | Age: 29
Setting detail: THERAPIES SERIES
Discharge: HOME | End: 2024-06-28
Payer: COMMERCIAL

## 2024-06-28 PROCEDURE — 97530 THERAPEUTIC ACTIVITIES: CPT

## 2024-06-28 PROCEDURE — 20561 NDL INSJ W/O NJX 3+ MUSC: CPT

## 2024-06-28 NOTE — CARE COORDINATION
SPOKE WITH JAME AT [facility], SHE AGREED TO 1 X WEEK FOR 4 MORE WEEKS AND WILL EXTEND THE C9 OUT FOR 4 WEEKS, SHE WILL FAX OVER THE NEW END DATE

## 2024-06-28 NOTE — FLOWSHEET NOTE
Outpatient Physical Therapy  [place]           [x] Phone: [phone]   Fax: [phone]  [place]           [] Phone: [phone]   Fax: [phone]        Physical Therapy Daily Treatment Note  Date:  2024    Patient Name:  Alex Sharma    :  1995  MRN: [id number]  Restrictions/Precautions: Restrictions/Precautions: General Precautions        Diagnosis:   Sprain of deltoid ligament of right ankle, initial encounter [S93.421A]    Date of Injury: 22  Treatment Diagnosis:  chronic R ankle instability  Insurance/Certification information: [facility]- 18 by 24  Referring Physician:  Donald Marcelo MD     PCP: Tukcer Mata PA-C  Next Doctor Visit:  24  Plan of care signed (Y/N):  yes  Outcome Measure: LEFS: 65/80  Visit# / total visits:    Pain level: 0/10   Goals:     Patient goals: more stability in ankle progressing   Short term goals  Time Frame for Short term goals: refer to [facility]         Long Term Goals  Time Frame for Long Term Goals: 10 visits  Pt will report overall improvement in condition by 50% or more met   pt will improve LEFS to 70/80 or more to show subjective improvement  pt will improve R ankle plantarflexion/dorsiflexion strength to 5/5 for improved driving tolerance almost met   pt will improve R ankle inversion/eversion strength to 4+/5 for improved ambulating on uneven surfaces almost met   pt will be able to climb a ladder with feeling little to no instability of the R ankle for return to work full duty met       Summary of Evaluation:  Assessment: Pt is a 28 yo male that presents to therapy with complaints of R chronic ankle pain and instability. Initial injury was 22 and was in PT twice for this. His ankle gave way in January and has been on desk duty since. He is a  at [employer], does not have a return to full duty date yet. He likes to play basketball and would also like to be able to return to

## 2024-07-16 ENCOUNTER — HOSPITAL ENCOUNTER (OUTPATIENT)
Dept: PHYSICAL THERAPY | Age: 29
Setting detail: THERAPIES SERIES
Discharge: HOME | End: 2024-07-16
Payer: COMMERCIAL

## 2024-07-16 PROCEDURE — 20561 NDL INSJ W/O NJX 3+ MUSC: CPT

## 2024-07-16 NOTE — FLOWSHEET NOTE
Pt notified that mild needle soreness x 1-2 days may be present and is an expected side effect.  Mild bruising at needle site(s) may also occur.  Patient wished to proceed w/DNT.        Home Exercise Program:  Issued, practiced and pt demo ability to perform on eval date  4/29: Blue [place] given   Access Code: [email or web address]   URL: [email or web address]   Date: 04/30/2024   Prepared by: Meagan Hernandez, PT, DPT, Cert. DN       Exercises   - Long Sitting Ankle Eversion with Resistance  - 2 x daily - 7 x weekly - 2 sets - 10 reps DLBluTB  - Long Sitting Ankle Dorsiflexion with Anchored Resistance  - 2 x daily - 7 x weekly - 2 sets - 10 reps DLBluTB  - Long Sitting Ankle Inversion with Resistance  - 2 x daily - 7 x weekly - 2 sets - 10 reps DLBluTB  - Long Sitting Ankle Plantar Flexion with Resistance  - 2 x daily - 7 x weekly - 2 sets - 10 reps DLBlkTB  - Single Leg Stance  - 2 x daily - 7 x weekly - 2 sets - 30 hold     5/24: running on treadmill 5 min a day to build stamina   5/28: towel crunches, marble , PF stretching     Manual Treatments:Pt positioned in long sitting.  9 .30x30 and 5 .16x15  mm needles inserted into R peroneals and bilateral malleoli. Needles were left in place for 25 minutes with winding every 5 minutes. Pt did not demo any adverse reactions this date in the clinic.     Modalities:  none      Communication with other providers:  none this date     Mary Ellen spoke with [facility] representative: DNT is covered under his POC    Assessment: Pt tolerated treatment well today. Alex's peroneals were significantly tighter today than they have been. Pt would benefit from continued skilled physical therapy to address remaining impairments and limitations, progress toward goal completion, promote independence with ADLs, and prevent further injury. end pain: 0/10      Plan for Next Session:   Specific Instructions for Next Treatment: R ankle ROM/strength, balance on uneven surfaces, return to work, DNT?    Time In

## 2024-07-29 ENCOUNTER — HOSPITAL ENCOUNTER (OUTPATIENT)
Dept: PHYSICAL THERAPY | Age: 29
Setting detail: THERAPIES SERIES
Discharge: HOME | End: 2024-07-29
Payer: COMMERCIAL

## 2024-07-29 PROCEDURE — 20561 NDL INSJ W/O NJX 3+ MUSC: CPT

## 2024-07-29 NOTE — FLOWSHEET NOTE
experiences instability when walking and has the most pain with driving. Pt demo impaired R ankle ROM/strength, balance, activity tolerance, work tolerance, increased pain and increased edema. Pt would benefit from continued skilled physical therapy to address impairments and limitations, progress toward goal completion, promote independence with ADLs, and prevent further injury.        Subjective:  pt reports that he did not have any issues with the ankle with being on vacation. He notes that he has been stretching more and has not had much issues with it from day to day. He feels that things are getting better. He feels that his ankle is a little more stable but is still hesitant to do things. He still uses his brace to mow and at work. Pt would like to put his chart on hold for 30 days, has 2 visits remaining in his C9.       Any changes in Ambulatory Summary Sheet?  None      Objective:    R ankle dorsiflexion, plantarflexion and inversion strength: 5/5  R ankle eversion strength: 4+/5   Decreased edema R lateral malleolus       Exercises: (No more than 4 columns)  R ANKLE   Exercise/Equipment 6/28/24 #14  7/16/24 #15  7/29/24 #16            WARM UP      Nustep          Elliptical       Treadmill       TABLE      Ankle 4 way       Abc with weight on foot       Seated plantarflexion stretch       Wobbleboard ROM       Towel scrunches       Merced        STANDING      Gastroc stretch      HR/TR       Shuttle HR      FM walking                  PROPRIOCEPTION      Tandem       R SLS         Wobble board      BOSU Lunges       BOSU ball pass      BOSU squat      BLAZE PODS       4 point shuffle       4 corner color catch       Running in a line             RETURN TO FULL DUTY      Ladder       Uneven ground walking/running      Sled push pull       Back pedaling with jog fwd      MODALITIES      Vaso       DNT  See below  See below  See below        Other Therapeutic Activities/Education:  HEP and importance

## 2025-01-17 ENCOUNTER — TELEPHONE (OUTPATIENT)
Dept: SURGERY | Age: 30
End: 2025-01-17

## 2025-01-17 NOTE — TELEPHONE ENCOUNTER
LEFT MESSAGE FOR PT TO CALL BACK AND SCHEDULE CONSULT APPT.    UMBILICAL HERNIA-PAPER REF FROM SANDHYA VELASQUEZ